# Patient Record
Sex: FEMALE | Race: WHITE | NOT HISPANIC OR LATINO | Employment: FULL TIME | URBAN - METROPOLITAN AREA
[De-identification: names, ages, dates, MRNs, and addresses within clinical notes are randomized per-mention and may not be internally consistent; named-entity substitution may affect disease eponyms.]

---

## 2017-02-24 ENCOUNTER — TRANSCRIBE ORDERS (OUTPATIENT)
Dept: LAB | Facility: CLINIC | Age: 42
End: 2017-02-24

## 2017-02-24 ENCOUNTER — APPOINTMENT (OUTPATIENT)
Dept: LAB | Facility: CLINIC | Age: 42
End: 2017-02-24
Payer: COMMERCIAL

## 2017-02-24 DIAGNOSIS — R00.2 PALPITATIONS: Primary | ICD-10-CM

## 2017-02-24 LAB — VENIPUNCTURE: NORMAL

## 2017-02-24 PROCEDURE — 36415 COLL VENOUS BLD VENIPUNCTURE: CPT

## 2019-01-14 ENCOUNTER — OFFICE VISIT (OUTPATIENT)
Dept: URGENT CARE | Facility: CLINIC | Age: 44
End: 2019-01-14
Payer: COMMERCIAL

## 2019-01-14 VITALS
HEIGHT: 61 IN | SYSTOLIC BLOOD PRESSURE: 128 MMHG | HEART RATE: 94 BPM | OXYGEN SATURATION: 99 % | TEMPERATURE: 100.9 F | DIASTOLIC BLOOD PRESSURE: 58 MMHG | WEIGHT: 130 LBS | BODY MASS INDEX: 24.55 KG/M2 | RESPIRATION RATE: 18 BRPM

## 2019-01-14 DIAGNOSIS — R68.89 FLU-LIKE SYMPTOMS: Primary | ICD-10-CM

## 2019-01-14 PROCEDURE — 99213 OFFICE O/P EST LOW 20 MIN: CPT | Performed by: NURSE PRACTITIONER

## 2019-01-14 NOTE — PROGRESS NOTES
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

## 2019-01-14 NOTE — PROGRESS NOTES
[facility]        NAME: Nathaniel Orourke is a 37 y o  female  : 1975    MRN: [id number]  DATE: 2019  TIME: 3:03 PM    Assessment and Plan   Flu-like symptoms [R68 89]  1  Flu-like symptoms           Patient Instructions     Influenza education provided  Follow up with PCP in 3-5 days  Proceed to  ER if symptoms worsen  She took aleve prior to coming to the clinic for fever  Chief Complaint     Chief Complaint   Patient presents with    Cold Like Symptoms     X 4 days    Fever         History of Present Illness       37year old female presents for symptoms of the flu that began 4 days ago  She did not get her flu shot this year, she is a  that was sent by her school nurse for a fevers of 102  She c/o fever, body aches, congestion, sinus pressure  The congestion and pressure ar worse while laying down  She has taken aleve and sudafed with some relief  She denies CP, SOB, wheezing  She is able to tolerate fluids, has a decrease in appetite  Review of Systems   Review of Systems   Constitutional: Positive for appetite change and fever  HENT: Positive for congestion and sinus pressure  Respiratory: Negative for shortness of breath and wheezing  Cardiovascular: Negative for chest pain  Gastrointestinal: Negative for nausea and vomiting  Musculoskeletal: Positive for myalgias  Neurological: Positive for headaches  Current Medications     No current outpatient prescriptions on file  Current Allergies     Allergies as of 2019    (No Known Allergies)            The following portions of the patient's history were reviewed and updated as appropriate: allergies, current medications, past family history, past medical history, past social history, past surgical history and problem list      History reviewed  No pertinent past medical history      Past Surgical History:   Procedure Laterality Date    GALLBLADDER SURGERY         History reviewed  No pertinent family history  Medications have been verified  Objective   /58 (BP Location: Left arm)   Pulse 94   Temp (!) 100 9 °F (38 3 °C) (Tympanic)   Resp 18   Ht 5' 1" (1 549 m)   Wt 59 kg (130 lb)   LMP 09/18/2018   SpO2 99%   BMI 24 56 kg/m²        Physical Exam     Physical Exam   Constitutional: She is oriented to person, place, and time  She appears well-developed and well-nourished  HENT:   Right Ear: Tympanic membrane, external ear and ear canal normal    Left Ear: Tympanic membrane, external ear and ear canal normal    Nose: Mucosal edema and rhinorrhea present  Mouth/Throat: Posterior oropharyngeal erythema present  No posterior oropharyngeal edema  Cardiovascular: Normal rate, regular rhythm and normal heart sounds  Pulmonary/Chest: Effort normal and breath sounds normal  No respiratory distress  She has no rales  Positive for postnasal drip   Neurological: She is alert and oriented to person, place, and time  Skin: Skin is warm and dry  Nursing note and vitals reviewed

## 2019-01-14 NOTE — PATIENT INSTRUCTIONS
Influenza   AMBULATORY CARE:   Influenza  (the flu) is an infection caused by the influenza virus  The flu is easily spread when an infected person coughs, sneezes, or has close contact with others  You may be able to spread the flu to others for 1 week or longer after signs or symptoms appear  Common signs and symptoms include the following:   · Fever and chills    · Headaches, body aches, and muscle or joint pain    · Cough, runny nose, and sore throat    · Loss of appetite, nausea, vomiting, or diarrhea    · Tiredness    · Trouble breathing  Call 911 for any of the following:   · You have trouble breathing, and your lips look purple or blue  · You have a seizure  Seek care immediately if:   · You are dizzy, or you are urinating less or not at all  · You have a headache with a stiff neck, and you feel tired or confused  · You have new pain or pressure in your chest     · Your symptoms, such as shortness of breath, vomiting, or diarrhea, get worse  · Your symptoms, such as fever and coughing, seem to get better, but then get worse  Contact your healthcare provider if:   · You have new muscle pain or weakness  · You have questions or concerns about your condition or care  Treatment for influenza  may include any of the following:  · Acetaminophen  decreases pain and fever  It is available without a doctor's order  Ask how much to take and how often to take it  Follow directions  Acetaminophen can cause liver damage if not taken correctly  · NSAIDs , such as ibuprofen, help decrease swelling, pain, and fever  This medicine is available with or without a doctor's order  NSAIDs can cause stomach bleeding or kidney problems in certain people  If you take blood thinner medicine, always ask your healthcare provider if NSAIDs are safe for you  Always read the medicine label and follow directions  · Antivirals  help fight a viral infection    Manage your symptoms:   · Rest  as much as you can to help you recover  · Drink liquids as directed  to help prevent dehydration  Ask how much liquid to drink each day and which liquids are best for you  Prevent the spread of the flu:   · Wash your hands often  Use soap and water  Wash your hands after you use the bathroom, change a child's diapers, or sneeze  Wash your hands before you prepare or eat food  Use gel hand cleanser when soap and water are not available  Do not touch your eyes, nose, or mouth unless you have washed your hands first            · Cover your mouth when you sneeze or cough  Cough into a tissue or the bend of your arm  · Clean shared items with a germ-killing   Clean table surfaces, doorknobs, and light switches  Do not share towels, silverware, and dishes with people who are sick  Wash bed sheets, towels, silverware, and dishes with soap and water  · Wear a mask  over your mouth and nose if you are sick or are near anyone who is sick  · Stay away from others  if you are sick  · Influenza vaccine  helps prevent influenza (flu)  Everyone older than 6 months should get a yearly influenza vaccine  Get the vaccine as soon as it is available, usually in September or October each year  Follow up with your healthcare provider as directed:  Write down your questions so you remember to ask them during your visits  © 2017 [employer] Information is for End User's use only and may not be sold, redistributed or otherwise used for commercial purposes  All illustrations and images included in CareNotes® are the copyrighted property of [employer]  or [employer]  The above information is an  only  It is not intended as medical advice for individual conditions or treatments  Talk to your doctor, nurse or pharmacist before following any medical regimen to see if it is safe and effective for you

## 2020-02-14 ENCOUNTER — TELEPHONE (OUTPATIENT)
Dept: ADMINISTRATIVE | Facility: OTHER | Age: 45
End: 2020-02-14

## 2020-02-14 ENCOUNTER — OFFICE VISIT (OUTPATIENT)
Dept: FAMILY MEDICINE CLINIC | Facility: CLINIC | Age: 45
End: 2020-02-14
Payer: COMMERCIAL

## 2020-02-14 VITALS
HEIGHT: 61 IN | HEART RATE: 71 BPM | RESPIRATION RATE: 12 BRPM | DIASTOLIC BLOOD PRESSURE: 60 MMHG | SYSTOLIC BLOOD PRESSURE: 98 MMHG | WEIGHT: 140 LBS | BODY MASS INDEX: 26.43 KG/M2 | OXYGEN SATURATION: 98 % | TEMPERATURE: 97.1 F

## 2020-02-14 DIAGNOSIS — R20.2 PARESTHESIA OF LEFT FOOT: Primary | ICD-10-CM

## 2020-02-14 DIAGNOSIS — Z76.89 ENCOUNTER TO ESTABLISH CARE: ICD-10-CM

## 2020-02-14 DIAGNOSIS — Z12.39 SCREENING FOR MALIGNANT NEOPLASM OF BREAST: ICD-10-CM

## 2020-02-14 DIAGNOSIS — I95.9 HYPOTENSION, UNSPECIFIED HYPOTENSION TYPE: ICD-10-CM

## 2020-02-14 DIAGNOSIS — Z11.4 SCREENING FOR HIV (HUMAN IMMUNODEFICIENCY VIRUS): ICD-10-CM

## 2020-02-14 DIAGNOSIS — M25.562 LEFT KNEE PAIN, UNSPECIFIED CHRONICITY: ICD-10-CM

## 2020-02-14 DIAGNOSIS — M25.472 LEFT ANKLE SWELLING: ICD-10-CM

## 2020-02-14 PROCEDURE — 1036F TOBACCO NON-USER: CPT | Performed by: NURSE PRACTITIONER

## 2020-02-14 PROCEDURE — 3008F BODY MASS INDEX DOCD: CPT | Performed by: NURSE PRACTITIONER

## 2020-02-14 PROCEDURE — 36415 COLL VENOUS BLD VENIPUNCTURE: CPT | Performed by: NURSE PRACTITIONER

## 2020-02-14 PROCEDURE — 99214 OFFICE O/P EST MOD 30 MIN: CPT | Performed by: NURSE PRACTITIONER

## 2020-02-14 NOTE — PROGRESS NOTES
Assessment/Plan:  1  Paresthesia of left foot  - CBC and differential  - Comprehensive metabolic panel  2  Left knee pain, unspecified chronicity  - CBC and differential  - Comprehensive metabolic panel  3  Left ankle swelling  - CBC and differential  - Comprehensive metabolic panel  4  Encounter to establish care  Health maintenance discussed  Diet, exercise, weight management, stress management, etc    All questions addressed, answered, and pt verbalized understanding  Anticipatory guidance  - CBC and differential  - Comprehensive metabolic panel  - Hemoglobin A1C  - Lipid panel  - TSH, 3rd generation  - Vitamin D 25 hydroxy  5  Hypotension, unspecified hypotension type  - CBC and differential  - Comprehensive metabolic panel  - TSH, 3rd generation  6  Screening for malignant neoplasm of breast  - Mammo screening bilateral w cad; Future  7  Screening for HIV (human immunodeficiency virus)  - Human Immunodeficiency Virus 1/2 Antigen / Antibody ( Fourth Generation) with Reflex Testing         BMI Counseling: Body mass index is 26 45 kg/m²  The BMI is above normal  Nutrition recommendations include reducing portion sizes, decreasing overall calorie intake and moderation in carbohydrate intake  Exercise recommendations include exercising 3-5 times per week  continue regular exercise program    Depression Screening Follow-up Plan: Patient's depression screening was positive with a PHQ-2 score of 0  Their PHQ-9 score was 0  Clinically patient does not have depression  No treatment is required  Subjective:      Patient ID: Patrice Mata is a 40 y o  female  here to establish care  Pt here to establish care  PMH, meds, allergies, SH, FH reviewed    Lives with   Has 25and 21year old children  No tobacco  Social etoh  No recreational drug use  Works as      Current medical conditions include: none  Current issues or concerns include: left foot numb for a several months  No back injury  Pain left knee for the same amount of time  No known injury  Left ankle swelling intermittently  No sob  No chest pain  Exercises regularly  Reports that blood pressure has always been low  Denies dizziness or lightheadedness  The following portions of the patient's history were reviewed and updated as appropriate: allergies, current medications, past family history, past medical history, past social history, past surgical history and problem list     Review of Systems   Constitutional: Negative for activity change, appetite change, fatigue and unexpected weight change  HENT: Negative for congestion and sore throat  Eyes: Negative for visual disturbance  Respiratory: Negative for chest tightness, shortness of breath and wheezing  Cardiovascular: Positive for leg swelling (left ankle at times)  Negative for chest pain and palpitations  Gastrointestinal: Negative for abdominal pain, diarrhea, nausea and vomiting  Endocrine: Negative for polydipsia, polyphagia and polyuria  Genitourinary: Negative for dysuria, frequency and urgency  Musculoskeletal: Positive for arthralgias (left knee at times) and joint swelling (left ankle at times)  Negative for back pain, myalgias and neck pain  Skin: Negative for color change, pallor, rash and wound  Allergic/Immunologic: Negative for immunocompromised state  Neurological: Negative for dizziness and headaches  Hematological: Does not bruise/bleed easily  Psychiatric/Behavioral: Negative for self-injury and suicidal ideas  The patient is not nervous/anxious  All other systems reviewed and are negative          Objective:      BP 98/60 (BP Location: Right arm, Patient Position: Sitting, Cuff Size: Standard)   Pulse 71   Temp (!) 97 1 °F (36 2 °C) (Temporal)   Resp 12   Ht 5' 1" (1 549 m)   Wt 63 5 kg (140 lb)   LMP 01/13/2020 (Approximate)   SpO2 98%   BMI 26 45 kg/m²          Physical Exam Constitutional: She is oriented to person, place, and time  She appears well-developed and well-nourished  No distress  Neck: Normal range of motion  Neck supple  No thyromegaly present  Cardiovascular: Normal rate and regular rhythm  No JVD  No audible carotid bruit  No peripheral edema  Pulmonary/Chest: Effort normal and breath sounds normal  No respiratory distress  She has no wheezes  Neurological: She is alert and oriented to person, place, and time  No cranial nerve deficit  Coordination normal    Skin: Skin is warm and dry  No rash noted  She is not diaphoretic  No erythema  No pallor  Psychiatric: She has a normal mood and affect  Her behavior is normal  Judgment and thought content normal    Vitals reviewed

## 2020-02-14 NOTE — TELEPHONE ENCOUNTER
----- Message from Clary June [employer] sent at 2/14/2020 10:37 AM EST -----  Regarding: PAP- [facility]  Contact: [phone]  02/14/20 10:40 AM    Hello, our patient Loleta Krabbe has had Pap Smear (HPV) aka Cervical Cancer Screening completed/performed  Please assist in updating the patient chart by making an External outreach to [facility] facility located in [place], [place]  The date of service is 3/2019      Thank you,  MARCO June PG

## 2020-02-14 NOTE — LETTER
Procedure Request Form: Cervical Cancer Screening      Date Requested: 20  Patient: Hector Juan  Patient : 1975   Referring Provider: Angelica Blanco, CRNP        Date of Procedure ______________________________       The above patient has informed us that they have completed their   most recent Cervical Cancer Screening at your facility  Please complete   this form and attach all corresponding procedure reports/results  Comments __________________________________________________________  ____________________________________________________________________  ____________________________________________________________________  ____________________________________________________________________    Facility Completing Procedure _________________________________________    Form Completed By (print name) _______________________________________      Signature __________________________________________________________      These reports are needed for  compliance    Please fax this completed form and a copy of the procedure report to our office located at [place] [address] as soon as possible to [phone] attention Vickie Nolasco: Phone [phone]    We thank you for your assistance in treating our mutual patient

## 2020-02-14 NOTE — LETTER
Procedure Request Form: Cervical Cancer Screening      Date Requested: 20  Patient: Sindy Shah  Patient : 1975   Referring Provider: Brent Hernandez, ZENOBIA        Date of Procedure ______________________________       The above patient has informed us that they have completed their   most recent Cervical Cancer Screening at your facility  Please complete   this form and attach all corresponding procedure reports/results  Comments __________________________________________________________  ____________________________________________________________________  ____________________________________________________________________  ____________________________________________________________________    Facility Completing Procedure _________________________________________    Form Completed By (print name) _______________________________________      Signature __________________________________________________________      These reports are needed for  compliance    Please fax this completed form and a copy of the procedure report to our office located at [place] [address] as soon as possible to [phone] attention Brayan Lerma: Phone [phone]    We thank you for your assistance in treating our mutual patient

## 2020-02-14 NOTE — TELEPHONE ENCOUNTER
Upon review of the In Basket request and the patient's chart, initial outreach has been made via fax, please see Contacts section for details  A second outreach attempt will be made within 3 business days      Thank you  Marcie Vuong

## 2020-02-16 LAB
25(OH)D3+25(OH)D2 SERPL-MCNC: 25.2 NG/ML (ref 30–100)
ALBUMIN SERPL-MCNC: 4.3 G/DL (ref 3.8–4.8)
ALBUMIN/GLOB SERPL: 2.2 {RATIO} (ref 1.2–2.2)
ALP SERPL-CCNC: 65 IU/L (ref 39–117)
ALT SERPL-CCNC: 28 IU/L (ref 0–32)
AST SERPL-CCNC: 34 IU/L (ref 0–40)
BASOPHILS # BLD AUTO: 0 X10E3/UL (ref 0–0.2)
BASOPHILS NFR BLD AUTO: 1 %
BILIRUB SERPL-MCNC: 0.2 MG/DL (ref 0–1.2)
BUN SERPL-MCNC: 19 MG/DL (ref 6–24)
BUN/CREAT SERPL: 24 (ref 9–23)
CALCIUM SERPL-MCNC: 9.3 MG/DL (ref 8.7–10.2)
CHLORIDE SERPL-SCNC: 104 MMOL/L (ref 96–106)
CHOLEST SERPL-MCNC: 183 MG/DL (ref 100–199)
CHOLEST/HDLC SERPL: 2.9 RATIO (ref 0–4.4)
CO2 SERPL-SCNC: 26 MMOL/L (ref 20–29)
CREAT SERPL-MCNC: 0.78 MG/DL (ref 0.57–1)
EOSINOPHIL # BLD AUTO: 0.4 X10E3/UL (ref 0–0.4)
EOSINOPHIL NFR BLD AUTO: 10 %
ERYTHROCYTE [DISTWIDTH] IN BLOOD BY AUTOMATED COUNT: 13.8 % (ref 11.7–15.4)
EST. AVERAGE GLUCOSE BLD GHB EST-MCNC: 103 MG/DL
GLOBULIN SER-MCNC: 2 G/DL (ref 1.5–4.5)
GLUCOSE SERPL-MCNC: 96 MG/DL (ref 65–99)
HBA1C MFR BLD: 5.2 % (ref 4.8–5.6)
HCT VFR BLD AUTO: 52.4 % (ref 34–46.6)
HDLC SERPL-MCNC: 64 MG/DL
HGB BLD-MCNC: 17.5 G/DL (ref 11.1–15.9)
HIV 1+2 AB+HIV1 P24 AG SERPL QL IA: NON REACTIVE
IMM GRANULOCYTES # BLD: 0 X10E3/UL (ref 0–0.1)
IMM GRANULOCYTES NFR BLD: 0 %
LDLC SERPL CALC-MCNC: 98 MG/DL (ref 0–99)
LYMPHOCYTES # BLD AUTO: 2.1 X10E3/UL (ref 0.7–3.1)
LYMPHOCYTES NFR BLD AUTO: 50 %
MCH RBC QN AUTO: 30.1 PG (ref 26.6–33)
MCHC RBC AUTO-ENTMCNC: 33.4 G/DL (ref 31.5–35.7)
MCV RBC AUTO: 90 FL (ref 79–97)
MONOCYTES # BLD AUTO: 0.3 X10E3/UL (ref 0.1–0.9)
MONOCYTES NFR BLD AUTO: 7 %
NEUTROPHILS # BLD AUTO: 1.4 X10E3/UL (ref 1.4–7)
NEUTROPHILS NFR BLD AUTO: 32 %
PLATELET # BLD AUTO: 123 X10E3/UL (ref 150–450)
POTASSIUM SERPL-SCNC: 4.6 MMOL/L (ref 3.5–5.2)
PROT SERPL-MCNC: 6.3 G/DL (ref 6–8.5)
RBC # BLD AUTO: 5.81 X10E6/UL (ref 3.77–5.28)
SL AMB EGFR AFRICAN AMERICAN: 107 ML/MIN/1.73
SL AMB EGFR NON AFRICAN AMERICAN: 93 ML/MIN/1.73
SL AMB VLDL CHOLESTEROL CALC: 21 MG/DL (ref 5–40)
SODIUM SERPL-SCNC: 144 MMOL/L (ref 134–144)
TRIGL SERPL-MCNC: 107 MG/DL (ref 0–149)
TSH SERPL DL<=0.005 MIU/L-ACNC: 1.44 UIU/ML (ref 0.45–4.5)
WBC # BLD AUTO: 4.3 X10E3/UL (ref 3.4–10.8)

## 2020-02-18 ENCOUNTER — OFFICE VISIT (OUTPATIENT)
Dept: FAMILY MEDICINE CLINIC | Facility: CLINIC | Age: 45
End: 2020-02-18
Payer: COMMERCIAL

## 2020-02-18 VITALS
SYSTOLIC BLOOD PRESSURE: 100 MMHG | HEIGHT: 61 IN | TEMPERATURE: 97.7 F | DIASTOLIC BLOOD PRESSURE: 70 MMHG | OXYGEN SATURATION: 97 % | RESPIRATION RATE: 12 BRPM | BODY MASS INDEX: 26.62 KG/M2 | HEART RATE: 64 BPM | WEIGHT: 141 LBS

## 2020-02-18 DIAGNOSIS — Z23 NEED FOR DIPHTHERIA-TETANUS-PERTUSSIS (TDAP) VACCINE: ICD-10-CM

## 2020-02-18 DIAGNOSIS — Z00.00 ANNUAL PHYSICAL EXAM: Primary | ICD-10-CM

## 2020-02-18 DIAGNOSIS — R20.2 PARESTHESIA OF HAND, BILATERAL: ICD-10-CM

## 2020-02-18 DIAGNOSIS — R20.2 PARESTHESIA OF LEFT FOOT: ICD-10-CM

## 2020-02-18 DIAGNOSIS — G89.29 CHRONIC PAIN OF LEFT KNEE: ICD-10-CM

## 2020-02-18 DIAGNOSIS — M25.562 CHRONIC PAIN OF LEFT KNEE: ICD-10-CM

## 2020-02-18 DIAGNOSIS — E55.9 VITAMIN D DEFICIENCY: ICD-10-CM

## 2020-02-18 PROCEDURE — 3008F BODY MASS INDEX DOCD: CPT | Performed by: NURSE PRACTITIONER

## 2020-02-18 PROCEDURE — 90715 TDAP VACCINE 7 YRS/> IM: CPT

## 2020-02-18 PROCEDURE — 99396 PREV VISIT EST AGE 40-64: CPT | Performed by: NURSE PRACTITIONER

## 2020-02-18 PROCEDURE — 90471 IMMUNIZATION ADMIN: CPT

## 2020-02-18 NOTE — PROGRESS NOTES
FAMILY PRACTICE HEALTH MAINTENANCE OFFICE VISIT  [facility]    NAME: Ольга Garcia  AGE: 40 y o  SEX: female  : 1975     DATE: 2020    Assessment and Plan   1  Annual physical exam  Health maintenance discussed  Diet, exercise, weight management, stress management, etc    All questions addressed, answered, and pt verbalized understanding  Anticipatory guidance  2  Vitamin D deficiency  otc vitamin D3 1000 units daily  3  Paresthesia of left foot  - Ambulatory referral to Neurology; Future  4  Chronic pain of left knee  ? Etiology  As discussed may consider PT and/or ortho eval  5  Paresthesia of hand, bilateral  - Ambulatory referral to Neurology; Future  6  Need for diphtheria-tetanus-pertussis (Tdap) vaccine  - TDAP VACCINE GREATER THAN OR EQUAL TO 6YO IM      · Patient Counseling:   · Nutrition: Stressed importance of a well balanced diet, moderation of sodium/saturated fat, caloric balance and sufficient intake of fiber  · Exercise: Stressed the importance of regular exercise with a goal of 150 minutes per week  · Dental Health: Discussed daily flossing and brushing and regular dental visits   · Alcohol Use:  Recommended moderation of alcohol intake  · Injury Prevention: Discussed Safety Belts, Safety Helmets, and Smoke Detectors    · Immunizations reviewed: See Orders and Risks and Benefits discussed  · Discussed benefits of:  Mammogram , Cervical Cancer screening and Screening labs   BMI Counseling: Body mass index is 26 64 kg/m²  Discussed with patient's BMI with her  The BMI is above normal  Nutrition recommendations include reducing portion sizes, decreasing overall calorie intake, reducing fast food intake and moderation in carbohydrate intake  Exercise recommendations include exercising 3-5 times per week            Chief Complaint     Chief Complaint   Patient presents with    Physical Exam     Pt here for a annual exam  History of Present Illness     Here for annual exam and to review labs  Having issues with numbness left foot and both hands  Intermittent  At times, left knee pain  No known back injury  Active  Exercises regularly  Well Adult Physical   Patient here for a comprehensive physical exam       Diet and Physical Activity  Diet: well balanced diet  Exercise: frequently      Depression Screen  PHQ-9 Depression Screening    PHQ-9:    Frequency of the following problems over the past two weeks:       Little interest or pleasure in doing things:  0 - not at all  Feeling down, depressed, or hopeless:  0 - not at all  PHQ-2 Score:  0     Depression Screening Follow-up Plan: Patient's depression screening was negative with a PHQ-2 score of 0  Their PHQ-9 score was 0   Clinically patient does not have depression  No treatment is required  General Health  Hearing: Normal:  bilateral  Vision: goes for regular eye exams, most recent eye exam <1 year and wears contacts  Dental: regular dental visits and brushes teeth twice daily    Reproductive Health  Irregular Periods and Follows with gynecologist      The following portions of the patient's history were reviewed and updated as appropriate: allergies, current medications, past family history, past medical history, past social history, past surgical history and problem list     Review of Systems     Review of Systems   Constitutional: Negative for chills, diaphoresis, fatigue and fever  HENT: Negative for congestion, ear pain, sinus pressure, sinus pain and sore throat  Eyes: Negative for visual disturbance  Respiratory: Negative for cough, chest tightness, shortness of breath and wheezing  Cardiovascular: Negative for chest pain, palpitations and leg swelling  Gastrointestinal: Negative for abdominal distention, abdominal pain, diarrhea and nausea  Endocrine: Negative for cold intolerance, heat intolerance, polydipsia, polyphagia and polyuria  Genitourinary: Negative for dysuria, frequency and urgency  Musculoskeletal: Positive for arthralgias (left knee)  Negative for back pain and myalgias  Skin: Negative for color change and pallor  Allergic/Immunologic: Negative for immunocompromised state  Neurological: Positive for numbness (left foot, both hands  )  Negative for dizziness, weakness and headaches  Hematological: Negative for adenopathy  Psychiatric/Behavioral: Negative for agitation, self-injury and suicidal ideas  The patient is not nervous/anxious  All other systems reviewed and are negative  Past Medical History     No past medical history on file      Past Surgical History     Past Surgical History:   Procedure Laterality Date    GALLBLADDER SURGERY         Social History     Social History     Socioeconomic History    Marital status: /Civil Union     Spouse name: None    Number of children: None    Years of education: None    Highest education level: None   Occupational History    None   Social Needs    Financial resource strain: None    Food insecurity:     Worry: None     Inability: None    Transportation needs:     Medical: None     Non-medical: None   Tobacco Use    Smoking status: Never Smoker    Smokeless tobacco: Never Used   Substance and Sexual Activity    Alcohol use: Yes     Frequency: Monthly or less    Drug use: No    Sexual activity: Yes     Partners: Male     Birth control/protection: Male Sterilization   Lifestyle    Physical activity:     Days per week: None     Minutes per session: None    Stress: None   Relationships    Social connections:     Talks on phone: None     Gets together: None     Attends Sikhism service: None     Active member of club or organization: None     Attends meetings of clubs or organizations: None     Relationship status: None    Intimate partner violence:     Fear of current or ex partner: None     Emotionally abused: None     Physically abused: None Forced sexual activity: None   Other Topics Concern    None   Social History Narrative    None       Family History     Family History   Problem Relation Age of Onset    No Known Problems Mother     No Known Problems Father     Substance Abuse Neg Hx     Mental illness Neg Hx        Current Medications     No current outpatient medications on file       Allergies     No Known Allergies    Objective     Recent Results (from the past 672 hour(s))   CBC and differential    Collection Time: 02/14/20 11:03 AM   Result Value Ref Range    White Blood Cell Count 4 3 3 4 - 10 8 x10E3/uL    Red Blood Cell Count 5 81 (H) 3 77 - 5 28 x10E6/uL    Hemoglobin 17 5 (H) 11 1 - 15 9 g/dL    HCT 52 4 (H) 34 0 - 46 6 %    MCV 90 79 - 97 fL    MCH 30 1 26 6 - 33 0 pg    MCHC 33 4 31 5 - 35 7 g/dL    RDW 13 8 11 7 - 15 4 %    Platelet Count 663 (L) 150 - 450 x10E3/uL    Neutrophils 32 Not Estab  %    Lymphocytes 50 Not Estab  %    Monocytes 7 Not Estab  %    Eosinophils 10 Not Estab  %    Basophils PCT 1 Not Estab  %    Neutrophils (Absolute) 1 4 1 4 - 7 0 x10E3/uL    Lymphocytes (Absolute) 2 1 0 7 - 3 1 x10E3/uL    Monocytes (Absolute) 0 3 0 1 - 0 9 x10E3/uL    Eosinophils (Absolute) 0 4 0 0 - 0 4 x10E3/uL    Basophils ABS 0 0 0 0 - 0 2 x10E3/uL    Immature Granulocytes 0 Not Estab  %    Immature Granulocytes (Absolute) 0 0 0 0 - 0 1 x10E3/uL   Comprehensive metabolic panel    Collection Time: 02/14/20 11:03 AM   Result Value Ref Range    Glucose, Random 96 65 - 99 mg/dL    BUN 19 6 - 24 mg/dL    Creatinine 0 78 0 57 - 1 00 mg/dL    eGFR Non  93 >59 mL/min/1 73    eGFR  107 >59 mL/min/1 73    SL AMB BUN/CREATININE RATIO 24 (H) 9 - 23    Sodium 144 134 - 144 mmol/L    Potassium 4 6 3 5 - 5 2 mmol/L    Chloride 104 96 - 106 mmol/L    CO2 26 20 - 29 mmol/L    CALCIUM 9 3 8 7 - 10 2 mg/dL    Protein, Total 6 3 6 0 - 8 5 g/dL    Albumin 4 3 3 8 - 4 8 g/dL    Globulin, Total 2 0 1 5 - 4 5 g/dL Albumin/Globulin Ratio 2 2 1 2 - 2 2    TOTAL BILIRUBIN 0 2 0 0 - 1 2 mg/dL    Alk Phos Isoenzymes 65 39 - 117 IU/L    AST 34 0 - 40 IU/L    ALT 28 0 - 32 IU/L   Hemoglobin A1C    Collection Time: 02/14/20 11:03 AM   Result Value Ref Range    Hemoglobin A1C 5 2 4 8 - 5 6 %    Estimated Average Glucose 103 mg/dL   Lipid panel    Collection Time: 02/14/20 11:03 AM   Result Value Ref Range    Cholesterol, Total 183 100 - 199 mg/dL    Triglycerides 107 0 - 149 mg/dL    HDL 64 >39 mg/dL    VLDL Cholesterol Calculated 21 5 - 40 mg/dL    LDL Direct 98 0 - 99 mg/dL    T  Chol/HDL Ratio 2 9 0 0 - 4 4 ratio   TSH, 3rd generation    Collection Time: 02/14/20 11:03 AM   Result Value Ref Range    TSH 1 440 0 450 - 4 500 uIU/mL   Vitamin D 25 hydroxy    Collection Time: 02/14/20 11:03 AM   Result Value Ref Range    25-HYDROXY VIT D 25 2 (L) 30 0 - 100 0 ng/mL   Human Immunodeficiency Virus 1/2 Antigen / Antibody ( Fourth Generation) with Reflex Testing    Collection Time: 02/14/20 11:03 AM   Result Value Ref Range    HIV Screen 4th Generation wRflx Non Reactive Non Reactive           /70 (BP Location: Right arm, Patient Position: Sitting, Cuff Size: Adult)   Pulse 64   Temp 97 7 °F (36 5 °C) (Temporal)   Resp 12   Ht 5' 1" (1 549 m)   Wt 64 kg (141 lb)   LMP 01/06/2020 (Approximate)   SpO2 97%   BMI 26 64 kg/m²      Physical Exam   Constitutional: She is oriented to person, place, and time  She appears well-developed and well-nourished  No distress  HENT:   Head: Normocephalic  Mouth/Throat: Oropharynx is clear and moist  No oropharyngeal exudate  Eyes: Pupils are equal, round, and reactive to light  EOM are normal    Neck: Normal range of motion  Neck supple  No thyromegaly present  Cardiovascular: Normal rate and regular rhythm  No JVD  No audible carotid bruit  No peripheral edema  Pulmonary/Chest: Effort normal and breath sounds normal  No respiratory distress  She has no wheezes     Abdominal: Soft  Bowel sounds are normal  She exhibits no distension  There is no tenderness  Musculoskeletal: Normal range of motion  She exhibits no edema, tenderness or deformity  Lymphadenopathy:     She has no cervical adenopathy  Neurological: She is alert and oriented to person, place, and time  No cranial nerve deficit or sensory deficit  Coordination normal    Skin: Skin is warm and dry  No rash noted  She is not diaphoretic  No erythema  No pallor  Psychiatric: She has a normal mood and affect  Her behavior is normal  Judgment and thought content normal    Vitals reviewed  Visual Acuity Screening    Right eye Left eye Both eyes   Without correction:      With correction: 20/15 20/13 20/13   Comments: Pt correctly identified the colors  sp/cma          Kavin Lainez

## 2020-02-18 NOTE — PATIENT INSTRUCTIONS
Well balanced diet  Regular exercise  Start over the counter vitamin D3 1000 units daily     Daily multivitamin  Schedule appt for mammogram  Schedule appt with neurologist

## 2020-02-24 NOTE — TELEPHONE ENCOUNTER
As a follow-up, a second attempt has been made for outreach via fax, please see Contacts section for details  A third and final attempt will be made within 3 business days      Thank you  Ulysses Champion

## 2020-02-28 NOTE — TELEPHONE ENCOUNTER
As a final attempt, a third outreach has been made via telephone call  Please see Contacts section for details  This encounter will be closed and completed by end of day  Should we receive the requested information because of previous outreach attempts, the requested patient's chart will be updated appropriately       Thank you  Iain Villanueva

## 2020-12-22 ENCOUNTER — TELEMEDICINE (OUTPATIENT)
Dept: FAMILY MEDICINE CLINIC | Facility: CLINIC | Age: 45
End: 2020-12-22
Payer: COMMERCIAL

## 2020-12-22 VITALS — TEMPERATURE: 97.5 F

## 2020-12-22 DIAGNOSIS — Z20.822 EXPOSURE TO COVID-19 VIRUS: ICD-10-CM

## 2020-12-22 DIAGNOSIS — Z20.822 EXPOSURE TO COVID-19 VIRUS: Primary | ICD-10-CM

## 2020-12-22 PROCEDURE — U0003 INFECTIOUS AGENT DETECTION BY NUCLEIC ACID (DNA OR RNA); SEVERE ACUTE RESPIRATORY SYNDROME CORONAVIRUS 2 (SARS-COV-2) (CORONAVIRUS DISEASE [COVID-19]), AMPLIFIED PROBE TECHNIQUE, MAKING USE OF HIGH THROUGHPUT TECHNOLOGIES AS DESCRIBED BY CMS-2020-01-R: HCPCS | Performed by: NURSE PRACTITIONER

## 2020-12-22 PROCEDURE — 99213 OFFICE O/P EST LOW 20 MIN: CPT | Performed by: NURSE PRACTITIONER

## 2020-12-24 LAB — SARS-COV-2 RNA SPEC QL NAA+PROBE: NOT DETECTED

## 2020-12-28 ENCOUNTER — TELEMEDICINE (OUTPATIENT)
Dept: FAMILY MEDICINE CLINIC | Facility: CLINIC | Age: 45
End: 2020-12-28
Payer: COMMERCIAL

## 2020-12-28 VITALS — TEMPERATURE: 98 F

## 2020-12-28 DIAGNOSIS — Z20.822 EXPOSURE TO COVID-19 VIRUS: Primary | ICD-10-CM

## 2020-12-28 PROCEDURE — 1036F TOBACCO NON-USER: CPT | Performed by: NURSE PRACTITIONER

## 2020-12-28 PROCEDURE — 99212 OFFICE O/P EST SF 10 MIN: CPT | Performed by: NURSE PRACTITIONER

## 2021-02-08 ENCOUNTER — TELEMEDICINE (OUTPATIENT)
Dept: FAMILY MEDICINE CLINIC | Facility: CLINIC | Age: 46
End: 2021-02-08
Payer: COMMERCIAL

## 2021-02-08 VITALS — TEMPERATURE: 97.2 F

## 2021-02-08 DIAGNOSIS — Z20.822 EXPOSURE TO COVID-19 VIRUS: ICD-10-CM

## 2021-02-08 DIAGNOSIS — Z20.822 EXPOSURE TO COVID-19 VIRUS: Primary | ICD-10-CM

## 2021-02-08 PROCEDURE — U0003 INFECTIOUS AGENT DETECTION BY NUCLEIC ACID (DNA OR RNA); SEVERE ACUTE RESPIRATORY SYNDROME CORONAVIRUS 2 (SARS-COV-2) (CORONAVIRUS DISEASE [COVID-19]), AMPLIFIED PROBE TECHNIQUE, MAKING USE OF HIGH THROUGHPUT TECHNOLOGIES AS DESCRIBED BY CMS-2020-01-R: HCPCS | Performed by: NURSE PRACTITIONER

## 2021-02-08 PROCEDURE — 99213 OFFICE O/P EST LOW 20 MIN: CPT | Performed by: NURSE PRACTITIONER

## 2021-02-08 PROCEDURE — U0005 INFEC AGEN DETEC AMPLI PROBE: HCPCS | Performed by: NURSE PRACTITIONER

## 2021-02-08 NOTE — PATIENT INSTRUCTIONS
Refer to [facility] [place] for specific information and answers to many frequently asked questions  You are being tested for Covid19  If Covid-19 test is positive, self isolation for 10 days from onset of symptoms and 24 hours with no symptoms and no fever without use of anti-pyrectic medications (ie: Tylenol, Ibuprofen, Advil, etc ) before discontinuing self isolation and/or return to work or normal activities  If you test positive, even with no symptoms, you need to isolate for a full 10 days after positive test    While in self isolation you should be using a private bathroom, sleeping area, mask in the home if any other people are present, no shared eating utensils, etc    Deep breathing exercises to expand lung fields  Monitor temperature daily or if you feel you have a fever  Tylenol or Advil/Ibuprofen is appropriate for fever control  Check pulse oximetry,  if able,  daily and if you are having any increased shortness of breath or chest tightness  Call the office if you are having any increased respiratory symptoms or if pulse oximetry is persistently less than 92%  Current recommendations for treatment include daily multivitamin, vitamin C, zinc, and vitamin D supplementation  10 day quarantine is recommended for any high risk exposures , starting on date of initial exposure  High risk exposure is defined as being within 6 feet for 15 minutes cumulatively over 24 hours with a Covid positive patient unmasked  Social distancing is imperative  The use of a mask in public places is recommended going forward       CDC emergency warning signs for when to seek medical attention immediately:  - difficulty breathing or shortness of breath  - persistent pain or pressure in the chest   - new confusion or inability to arouse  - bluish lips or face   If you should need to seek medical care, you should notify the ED or EMS that you are under investigation and/or positive for COVID-19 prior and wear a facemask if possible, scarf or bandana

## 2021-02-08 NOTE — PROGRESS NOTES
COVID-19 Virtual Visit     Assessment/Plan:  1  Exposure to COVID-19 virus  Refer to CDC  gov for specific information and answers to many frequently asked questions  You are being tested for Covid19  If Covid-19 test is positive, self isolation for 10 days from onset of symptoms and 24 hours with no symptoms and no fever without use of anti-pyrectic medications (ie: Tylenol, Ibuprofen, Advil, etc ) before discontinuing self isolation and/or return to work or normal activities  If you test positive, even with no symptoms, you need to isolate for a full 10 days after positive test    While in self isolation you should be using a private bathroom, sleeping area, mask in the home if any other people are present, no shared eating utensils, etc    Deep breathing exercises to expand lung fields  Monitor temperature daily or if you feel you have a fever  Tylenol or Advil/Ibuprofen is appropriate for fever control  Check pulse oximetry,  if able,  daily and if you are having any increased shortness of breath or chest tightness  Call the office if you are having any increased respiratory symptoms or if pulse oximetry is persistently less than 92%  Current recommendations for treatment include daily multivitamin, vitamin C, zinc, and vitamin D supplementation  10 day quarantine is recommended for any high risk exposures , starting on date of initial exposure  High risk exposure is defined as being within 6 feet for 15 minutes cumulatively over 24 hours with a Covid positive patient unmasked  Social distancing is imperative  The use of a mask in public places is recommended going forward       [facility] [place] emergency warning signs for when to seek medical attention immediately:  - difficulty breathing or shortness of breath  - persistent pain or pressure in the chest   - new confusion or inability to arouse  - bluish lips or face   If you should need to seek medical care, you should notify the ED or EMS that you are under investigation and/or positive for COVID-19 prior and wear a facemask if possible, scarf or bandana     - Novel Coronavirus (Covid-19),PCR [facility] - Collected at [employer] or [facility]; Future    Problem List Items Addressed This Visit     None      Visit Diagnoses     Exposure to COVID-19 virus    -  Primary    Relevant Orders    Novel Coronavirus (Covid-19),PCR [facility] - Collected at [employer] or [facility]         Disposition:     I referred patient to one of our centralized sites for a COVID-19 swab  Pt with (+) exposure  Requests covid test    I have spent 15 minutes directly with the patient  Greater than 50% of this time was spent in counseling/coordination of care regarding: instructions for management, patient and family education, importance of treatment compliance and impressions  Encounter provider ZENOBIA Desai    Provider located at [place] [address]  [address] [address]    Recent Visits  No visits were found meeting these conditions  Showing recent visits within past 7 days and meeting all other requirements     Today's Visits  Date Type Provider Dept   02/08/21 Telemedicine ZENOBIA Desai  Zac Guzman   Showing today's visits and meeting all other requirements     Future Appointments  No visits were found meeting these conditions  Showing future appointments within next 150 days and meeting all other requirements      This virtual check-in was done via [employer] and patient was informed that this is a secure, HIPAA-compliant platform  She agrees to proceed  Patient agrees to participate in a virtual check in via telephone or video visit instead of presenting to the office to address urgent/immediate medical needs  Patient is aware this is a billable service  After connecting through [facility] [place], the patient was identified by name and date of birth   Chitraboby Christi was informed that this was a telemedicine visit and that the exam was being conducted confidentially over secure lines  My office door was closed  No one else was in the room  Theo Hartley acknowledged consent and understanding of privacy and security of the telemedicine visit  I informed the patient that I have reviewed her record in Epic and presented the opportunity for her to ask any questions regarding the visit today  The patient agreed to participate  Subjective:   Theo Hartley is a 39 y o  female who is concerned about COVID-19  Patient is currently asymptomatic  Patient denies fever, chills, fatigue, malaise, congestion, rhinorrhea, sore throat, anosmia, loss of taste, cough, shortness of breath, chest tightness, abdominal pain, nausea, vomiting, diarrhea, myalgias and headaches  Date of exposure: 2/5/2021    Exposure:   Contact with a person who is under investigation (PUI) for or who is positive for COVID-19 within the last 14 days?: Yes    Hospitalized recently for fever and/or lower respiratory symptoms?: No      Currently a healthcare worker that is involved in direct patient care?: No      Works in a special setting where the risk of COVID-19 transmission may be high? (this may include long-term care, correctional and longterm facilities; homeless shelters; assisted-living facilities and group homes ): No      Resident in a special setting where the risk of COVID-19 transmission may be high? 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 and do not always wear masks properly and do not social distance  Pt is asymptomatic  Would like to be tested     Will be on quarantine as per school guidelines until 2/22/2021    Lab Results   Component Value Date Phong Dill Not Detected 12/22/2020     History reviewed  No pertinent past medical history  Past Surgical History:   Procedure Laterality Date    GALLBLADDER SURGERY       No current outpatient medications on file  No current facility-administered medications for this visit  No Known Allergies    Review of Systems   Constitutional: Negative for chills, fatigue and fever  HENT: Negative for congestion, rhinorrhea and sore throat  Respiratory: Negative for cough, chest tightness and shortness of breath  Gastrointestinal: Negative for abdominal pain, diarrhea, nausea and vomiting  Musculoskeletal: Negative for myalgias  Neurological: Negative for headaches  Objective:    Vitals:    02/08/21 1216   Temp: (!) 97 2 °F (36 2 °C)   TempSrc: Oral       Physical Exam  Constitutional:       Appearance: She is not ill-appearing  HENT:      Head: Normocephalic  Eyes:      General: No scleral icterus  Pulmonary:      Effort: Pulmonary effort is normal  No respiratory distress  Skin:     Coloration: Skin is not jaundiced  Neurological:      Mental Status: She is alert and oriented to person, place, and time  Psychiatric:         Mood and Affect: Mood normal          Behavior: Behavior normal          Thought Content: Thought content normal          Judgment: Judgment normal        VIRTUAL VISIT DISCLAIMER    Nica Caponeahsan Santos acknowledges that she has consented to an online visit or consultation  She understands that the online visit is based solely on information provided by her, and that, in the absence of a face-to-face physical evaluation by the physician, the diagnosis she receives is both limited and provisional in terms of accuracy and completeness  This is not intended to replace a full medical face-to-face evaluation by the physician  Maylin Kennedy understands and accepts these terms  Male

## 2021-02-09 LAB — SARS-COV-2 RNA RESP QL NAA+PROBE: NEGATIVE

## 2021-02-11 ENCOUNTER — TELEMEDICINE (OUTPATIENT)
Dept: FAMILY MEDICINE CLINIC | Facility: CLINIC | Age: 46
End: 2021-02-11
Payer: COMMERCIAL

## 2021-02-11 VITALS — TEMPERATURE: 97.3 F

## 2021-02-11 DIAGNOSIS — Z20.822 EXPOSURE TO COVID-19 VIRUS: Primary | ICD-10-CM

## 2021-02-11 PROCEDURE — 99213 OFFICE O/P EST LOW 20 MIN: CPT | Performed by: NURSE PRACTITIONER

## 2021-02-11 NOTE — PROGRESS NOTES
COVID-19 Virtual Visit     Assessment/Plan:  1  Exposure to COVID-19 virus  Recent Results (from the past 672 hour(s))   Novel Coronavirus (Covid-19),PCR [facility] - Collected at   [facility]    Collection Time: 02/08/21  3:00 PM    Specimen: Nose; Nares   Result Value Ref Range    SARS-CoV-2 Negative Negative   reviewed CDC recs  Continue quarantine as per guidelines  covid test was negative  Contact office if situation changes or develops any symptoms  Anticipatory guidance  Problem List Items Addressed This Visit     None      Visit Diagnoses     Exposure to COVID-19 virus    -  Primary         Disposition:     I recommended continued isolation until at least 24 hours have passed since recovery defined as resolution of fever without the use of fever-reducing medications AND improvement in COVID symptoms AND 10 days have passed since onset of symptoms (or 10 days have passed since date of first positive viral diagnostic test for asymptomatic patients)  I have spent 10 minutes directly with the patient  Greater than 50% of this time was spent in counseling/coordination of care regarding: diagnostic results, instructions for management, patient and family education, importance of treatment compliance and impressions  Encounter provider ZENOBIA Li    Provider located at [place] [id number]  [address] [address]    Recent Visits  Date Type Provider Dept   02/08/21 ZENOBIA Brian Pg   Showing recent visits within past 7 days and meeting all other requirements     Today's Visits  Date Type Provider Dept   02/11/21 Telemedicine ZENOBIA Li Pg   Showing today's visits and meeting all other requirements     Future Appointments  No visits were found meeting these conditions     Showing future appointments within next 150 days and meeting all other requirements      This virtual check-in was done via [employer] and patient was informed that this is a secure, HIPAA-compliant platform  She agrees to proceed  Patient agrees to participate in a virtual check in via telephone or video visit instead of presenting to the office to address urgent/immediate medical needs  Patient is aware this is a billable service  After connecting through [place], the patient was identified by name and date of birth  Dennis Crum was informed that this was a telemedicine visit and that the exam was being conducted confidentially over secure lines  My office door was closed  No one else was in the room  Dennis Crum acknowledged consent and understanding of privacy and security of the telemedicine visit  I informed the patient that I have reviewed her record in Epic and presented the opportunity for her to ask any questions regarding the visit today  The patient agreed to participate  Subjective:   Dennis Crum is a 39 y o  female who has been screened for COVID-19  Symptom change since last report: unchanged  Patient is currently asymptomatic  Patient denies fever, chills, fatigue, malaise, congestion, rhinorrhea, sore throat, anosmia, loss of taste, cough, shortness of breath, chest tightness, abdominal pain, nausea, vomiting, diarrhea, myalgias and headaches  Brandi Flores has been staying home and has isolated themselves in her home  She is taking care to not share personal items and is cleaning all surfaces that are touched often, like counters, tabletops, and doorknobs using household cleaning sprays or wipes  She is wearing a mask when she leaves her room  Date of exposure: 2/5/2021    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   Several hours but children are not diligent about wearing masks properly  Sent home to quarantine through 2/22  Has been doing so, still feels fine  Tested 2/8, negative      Lab Results   Component Value Date    SARSCOV2 Negative 02/08/2021 [address] 98 Not Detected 12/22/2020     History reviewed  No pertinent past medical history  Past Surgical History:   Procedure Laterality Date    GALLBLADDER SURGERY       No current outpatient medications on file  No current facility-administered medications for this visit  No Known Allergies    Review of Systems   Constitutional: Negative for chills, fatigue and fever  HENT: Negative for congestion, rhinorrhea and sore throat  Respiratory: Negative for cough, chest tightness and shortness of breath  Gastrointestinal: Negative for abdominal pain, diarrhea, nausea and vomiting  Musculoskeletal: Negative for myalgias  Neurological: Negative for headaches  Objective:    Vitals:    02/11/21 0849   Temp: (!) 97 3 °F (36 3 °C)   TempSrc: Oral       Physical Exam  Constitutional:       Appearance: She is not ill-appearing  HENT:      Head: Normocephalic  Eyes:      General: No scleral icterus  Pulmonary:      Effort: Pulmonary effort is normal  No respiratory distress  Skin:     Coloration: Skin is not jaundiced or pale  Neurological:      Mental Status: She is alert and oriented to person, place, and time  Psychiatric:         Mood and Affect: Mood normal          Behavior: Behavior normal          Thought Content: Thought content normal          Judgment: Judgment normal        VIRTUAL VISIT DISCLAIMER    Nica García acknowledges that she has consented to an online visit or consultation  She understands that the online visit is based solely on information provided by her, and that, in the absence of a face-to-face physical evaluation by the physician, the diagnosis she receives is both limited and provisional in terms of accuracy and completeness  This is not intended to replace a full medical face-to-face evaluation by the physician  Sindy Shah understands and accepts these terms

## 2021-03-15 ENCOUNTER — TELEPHONE (OUTPATIENT)
Dept: FAMILY MEDICINE CLINIC | Facility: CLINIC | Age: 46
End: 2021-03-15

## 2021-03-15 NOTE — TELEPHONE ENCOUNTER
Called patient to schedule an appointment  She answered once and the call was disconnected  I called back again and it went to voicemail

## 2021-03-18 ENCOUNTER — OFFICE VISIT (OUTPATIENT)
Dept: FAMILY MEDICINE CLINIC | Facility: CLINIC | Age: 46
End: 2021-03-18
Payer: COMMERCIAL

## 2021-03-18 VITALS
HEART RATE: 66 BPM | SYSTOLIC BLOOD PRESSURE: 118 MMHG | WEIGHT: 133 LBS | RESPIRATION RATE: 16 BRPM | HEIGHT: 61 IN | DIASTOLIC BLOOD PRESSURE: 72 MMHG | OXYGEN SATURATION: 96 % | TEMPERATURE: 96.6 F | BODY MASS INDEX: 25.11 KG/M2

## 2021-03-18 DIAGNOSIS — E55.9 VITAMIN D DEFICIENCY: ICD-10-CM

## 2021-03-18 DIAGNOSIS — Z00.00 ANNUAL PHYSICAL EXAM: Primary | ICD-10-CM

## 2021-03-18 DIAGNOSIS — Z13.220 SCREENING FOR LIPID DISORDERS: ICD-10-CM

## 2021-03-18 DIAGNOSIS — Z12.31 ENCOUNTER FOR SCREENING MAMMOGRAM FOR MALIGNANT NEOPLASM OF BREAST: ICD-10-CM

## 2021-03-18 PROCEDURE — 36415 COLL VENOUS BLD VENIPUNCTURE: CPT | Performed by: NURSE PRACTITIONER

## 2021-03-18 PROCEDURE — 99396 PREV VISIT EST AGE 40-64: CPT | Performed by: NURSE PRACTITIONER

## 2021-03-18 PROCEDURE — 3008F BODY MASS INDEX DOCD: CPT | Performed by: NURSE PRACTITIONER

## 2021-03-18 PROCEDURE — 3725F SCREEN DEPRESSION PERFORMED: CPT | Performed by: NURSE PRACTITIONER

## 2021-03-18 PROCEDURE — 1036F TOBACCO NON-USER: CPT | Performed by: NURSE PRACTITIONER

## 2021-03-18 NOTE — PATIENT INSTRUCTIONS
Heart healthy diet- limit red meat, limit saturated fat, moderate salt intake, limit junk food, etc    Regular exercise  Stress management  Routine labwork and screenings as ordered

## 2021-03-18 NOTE — PROGRESS NOTES
FAMILY [facility] HEALTH MAINTENANCE OFFICE VISIT  [facility]    NAME: Loleta Krabbe  AGE: 39 y o  SEX: female  : 1975     DATE: 3/18/2021    Assessment and Plan     1  Annual physical exam  Heart healthy diet- limit red meat, limit saturated fat, moderate salt intake, limit junk food, etc    Regular exercise  Stress management  Routine labwork and screenings as ordered  - CBC and differential  - Comprehensive metabolic panel  - Lipid panel  - Vitamin D 25 hydroxy  2  Vitamin D deficiency  - CBC and differential  - Comprehensive metabolic panel  - Vitamin D 25 hydroxy  3  Screening for lipid disorders  - Lipid panel  4  Encounter for screening mammogram for malignant neoplasm of breast  - Mammo screening bilateral w 3d & cad; Future      · Patient Counseling:   · Nutrition: Stressed importance of a well balanced diet, moderation of sodium/saturated fat, caloric balance and sufficient intake of fiber  · Exercise: Stressed the importance of regular exercise with a goal of 150 minutes per week  · Dental Health: Discussed daily flossing and brushing and regular dental visits   · Injury Prevention: Discussed Safety Belts, Safety Helmets, and Smoke Detectors    · Immunizations reviewed: Risks and Benefits discussed and Declined recommended vaccinations  · Discussed benefits of:  Mammogram , Cervical Cancer screening and Screening labs   BMI Counseling: Body mass index is 25 13 kg/m²  Discussed with patient's BMI with her  The BMI is above normal  Nutrition recommendations include reducing portion sizes and decreasing overall calorie intake  Exercise recommendations include exercising 3-5 times per week            Chief Complaint     Chief Complaint   Patient presents with    Annual Exam       History of Present Illness     Here for annual physical  No recent illness  No specific concerns or issues        Well Adult Physical   Patient here for a comprehensive physical exam       Diet and Physical Activity  Diet: well balanced diet  Exercise: daily      Depression Screen  PHQ-9 Depression Screening    PHQ-9:   Frequency of the following problems over the past two weeks:      Little interest or pleasure in doing things: 0 - not at all  Feeling down, depressed, or hopeless: 0 - not at all  PHQ-2 Score: 0     Depression Screening Follow-up Plan: Patient's depression screening was negative with a PHQ-2 score of 0    Clinically patient does not have depression  No treatment is required  General Health  Hearing: Normal:  bilateral  Vision: most recent eye exam <1 year, wears glasses and wears contacts  Dental: regular dental visits    Reproductive Health  Follows with gynecologist      The following portions of the patient's history were reviewed and updated as appropriate: allergies, current medications, past family history, past medical history, past social history, past surgical history and problem list     Review of Systems     Review of Systems   Constitutional: Negative for chills, diaphoresis, fatigue and fever  HENT: Negative for congestion, ear pain, sinus pressure, sinus pain and sore throat  Eyes: Negative for visual disturbance  Respiratory: Negative for cough, chest tightness, shortness of breath and wheezing  Cardiovascular: Negative for chest pain, palpitations and leg swelling  Gastrointestinal: Negative for abdominal distention, abdominal pain, diarrhea and nausea  Endocrine: Negative for cold intolerance, heat intolerance, polydipsia, polyphagia and polyuria  Genitourinary: Negative for dysuria, frequency and urgency  Musculoskeletal: Negative for arthralgias, back pain and myalgias  Skin: Negative for color change, pallor, rash and wound  Allergic/Immunologic: Negative for immunocompromised state  Neurological: Negative for dizziness, weakness and headaches  Hematological: Negative for adenopathy     Psychiatric/Behavioral: Negative for agitation  The patient is not nervous/anxious  All other systems reviewed and are negative  Past Medical History     History reviewed  No pertinent past medical history  Past Surgical History     Past Surgical History:   Procedure Laterality Date    GALLBLADDER SURGERY         Social History     Social History     Socioeconomic History    Marital status: /Civil Union     Spouse name: None    Number of children: None    Years of education: None    Highest education level: None   Occupational History    None   Social Needs    Financial resource strain: None    Food insecurity     Worry: None     Inability: None    Transportation needs     Medical: None     Non-medical: None   Tobacco Use    Smoking status: Never Smoker    Smokeless tobacco: Never Used   Substance and Sexual Activity    Alcohol use: Yes     Frequency: Monthly or less    Drug use: No    Sexual activity: Yes     Partners: Male     Birth control/protection: Male Sterilization   Lifestyle    Physical activity     Days per week: None     Minutes per session: None    Stress: None   Relationships    Social connections     Talks on phone: None     Gets together: None     Attends Rastafari service: None     Active member of club or organization: None     Attends meetings of clubs or organizations: None     Relationship status: None    Intimate partner violence     Fear of current or ex partner: None     Emotionally abused: None     Physically abused: None     Forced sexual activity: None   Other Topics Concern    None   Social History Narrative    None       Family History     Family History   Problem Relation Age of Onset    No Known Problems Mother     No Known Problems Father     Substance Abuse Neg Hx     Mental illness Neg Hx        Current Medications     No current outpatient medications on file       Allergies     No Known Allergies    Objective     /72 (BP Location: Right arm, Patient Position: Sitting, Cuff Size: Adult)   Pulse 66   Temp (!) 96 6 °F (35 9 °C) (Temporal)   Resp 16   Ht 5' 1" (1 549 m)   Wt 60 3 kg (133 lb)   SpO2 96%   BMI 25 13 kg/m²      Physical Exam  Vitals signs reviewed  Constitutional:       General: She is not in acute distress  Appearance: Normal appearance  She is well-developed  She is not ill-appearing  HENT:      Head: Normocephalic and atraumatic  Right Ear: Tympanic membrane and ear canal normal       Left Ear: Tympanic membrane and ear canal normal       Nose: Nose normal       Mouth/Throat:      Mouth: Mucous membranes are moist       Pharynx: Oropharynx is clear  Eyes:      General: No scleral icterus  Right eye: No discharge  Left eye: No discharge  Extraocular Movements: Extraocular movements intact  Pupils: Pupils are equal, round, and reactive to light  Neck:      Musculoskeletal: Normal range of motion and neck supple  Thyroid: No thyromegaly  Cardiovascular:      Rate and Rhythm: Normal rate and regular rhythm  Heart sounds: Normal heart sounds  No murmur  Pulmonary:      Effort: Pulmonary effort is normal  No respiratory distress  Breath sounds: Normal breath sounds  No wheezing or rales  Abdominal:      General: Bowel sounds are normal  There is no distension  Palpations: Abdomen is soft  Tenderness: There is no abdominal tenderness  Musculoskeletal: Normal range of motion  Right lower leg: No edema  Left lower leg: No edema  Lymphadenopathy:      Cervical: No cervical adenopathy  Skin:     General: Skin is warm and dry  Coloration: Skin is not jaundiced or pale  Neurological:      General: No focal deficit present  Mental Status: She is alert and oriented to person, place, and time  Cranial Nerves: No cranial nerve deficit  Sensory: No sensory deficit        Gait: Gait normal    Psychiatric:         Mood and Affect: Mood normal  Behavior: Behavior normal          Thought Content:  Thought content normal          Judgment: Judgment normal             Visual Acuity Screening    Right eye Left eye Both eyes   Without correction:      With correction: 20/15 20/13 20/15   Comments: Pt identified colors correctly          Kavin Fritz

## 2021-03-19 LAB
25(OH)D3+25(OH)D2 SERPL-MCNC: 31.7 NG/ML (ref 30–100)
ALBUMIN SERPL-MCNC: 4.2 G/DL (ref 3.8–4.8)
ALBUMIN/GLOB SERPL: 1.7 {RATIO} (ref 1.2–2.2)
ALP SERPL-CCNC: 87 IU/L (ref 39–117)
ALT SERPL-CCNC: 26 IU/L (ref 0–32)
AST SERPL-CCNC: 30 IU/L (ref 0–40)
BASOPHILS # BLD AUTO: 0.1 X10E3/UL (ref 0–0.2)
BASOPHILS NFR BLD AUTO: 1 %
BILIRUB SERPL-MCNC: 0.3 MG/DL (ref 0–1.2)
BUN SERPL-MCNC: 18 MG/DL (ref 6–24)
BUN/CREAT SERPL: 22 (ref 9–23)
CALCIUM SERPL-MCNC: 9.3 MG/DL (ref 8.7–10.2)
CHLORIDE SERPL-SCNC: 104 MMOL/L (ref 96–106)
CHOLEST SERPL-MCNC: 203 MG/DL (ref 100–199)
CHOLEST/HDLC SERPL: 3 RATIO (ref 0–4.4)
CO2 SERPL-SCNC: 26 MMOL/L (ref 20–29)
CREAT SERPL-MCNC: 0.81 MG/DL (ref 0.57–1)
EOSINOPHIL # BLD AUTO: 0.3 X10E3/UL (ref 0–0.4)
EOSINOPHIL NFR BLD AUTO: 6 %
ERYTHROCYTE [DISTWIDTH] IN BLOOD BY AUTOMATED COUNT: 12.7 % (ref 11.7–15.4)
GLOBULIN SER-MCNC: 2.5 G/DL (ref 1.5–4.5)
GLUCOSE SERPL-MCNC: 69 MG/DL (ref 65–99)
HCT VFR BLD AUTO: 40.9 % (ref 34–46.6)
HDLC SERPL-MCNC: 68 MG/DL
HGB BLD-MCNC: 13.6 G/DL (ref 11.1–15.9)
IMM GRANULOCYTES # BLD: 0 X10E3/UL (ref 0–0.1)
IMM GRANULOCYTES NFR BLD: 0 %
LDLC SERPL CALC-MCNC: 116 MG/DL (ref 0–99)
LYMPHOCYTES # BLD AUTO: 1.9 X10E3/UL (ref 0.7–3.1)
LYMPHOCYTES NFR BLD AUTO: 37 %
MCH RBC QN AUTO: 30.3 PG (ref 26.6–33)
MCHC RBC AUTO-ENTMCNC: 33.3 G/DL (ref 31.5–35.7)
MCV RBC AUTO: 91 FL (ref 79–97)
MONOCYTES # BLD AUTO: 0.5 X10E3/UL (ref 0.1–0.9)
MONOCYTES NFR BLD AUTO: 10 %
NEUTROPHILS # BLD AUTO: 2.4 X10E3/UL (ref 1.4–7)
NEUTROPHILS NFR BLD AUTO: 46 %
PLATELET # BLD AUTO: 223 X10E3/UL (ref 150–450)
POTASSIUM SERPL-SCNC: 4.8 MMOL/L (ref 3.5–5.2)
PROT SERPL-MCNC: 6.7 G/DL (ref 6–8.5)
RBC # BLD AUTO: 4.49 X10E6/UL (ref 3.77–5.28)
SL AMB EGFR AFRICAN AMERICAN: 101 ML/MIN/1.73
SL AMB EGFR NON AFRICAN AMERICAN: 88 ML/MIN/1.73
SL AMB VLDL CHOLESTEROL CALC: 19 MG/DL (ref 5–40)
SODIUM SERPL-SCNC: 142 MMOL/L (ref 134–144)
TRIGL SERPL-MCNC: 105 MG/DL (ref 0–149)
WBC # BLD AUTO: 5.2 X10E3/UL (ref 3.4–10.8)

## 2021-04-06 ENCOUNTER — TRANSCRIBE ORDERS (OUTPATIENT)
Dept: ADMINISTRATIVE | Facility: HOSPITAL | Age: 46
End: 2021-04-06

## 2021-04-09 DIAGNOSIS — Z23 ENCOUNTER FOR IMMUNIZATION: ICD-10-CM

## 2021-05-25 ENCOUNTER — HOSPITAL ENCOUNTER (OUTPATIENT)
Dept: RADIOLOGY | Facility: HOSPITAL | Age: 46
Discharge: HOME | End: 2021-05-25
Payer: COMMERCIAL

## 2021-05-25 VITALS — WEIGHT: 133 LBS | HEIGHT: 61 IN | BODY MASS INDEX: 25.11 KG/M2

## 2021-05-25 DIAGNOSIS — Z12.31 ENCOUNTER FOR SCREENING MAMMOGRAM FOR MALIGNANT NEOPLASM OF BREAST: ICD-10-CM

## 2021-05-25 PROCEDURE — 77063 BREAST TOMOSYNTHESIS BI: CPT

## 2021-05-25 PROCEDURE — 77067 SCR MAMMO BI INCL CAD: CPT

## 2021-10-07 ENCOUNTER — APPOINTMENT (OUTPATIENT)
Dept: RADIOLOGY | Facility: CLINIC | Age: 46
End: 2021-10-07
Payer: COMMERCIAL

## 2021-10-07 ENCOUNTER — OFFICE VISIT (OUTPATIENT)
Dept: URGENT CARE | Facility: CLINIC | Age: 46
End: 2021-10-07
Payer: COMMERCIAL

## 2021-10-07 VITALS
TEMPERATURE: 99.5 F | BODY MASS INDEX: 25.52 KG/M2 | SYSTOLIC BLOOD PRESSURE: 120 MMHG | HEIGHT: 60 IN | OXYGEN SATURATION: 99 % | WEIGHT: 130 LBS | HEART RATE: 64 BPM | RESPIRATION RATE: 18 BRPM | DIASTOLIC BLOOD PRESSURE: 70 MMHG

## 2021-10-07 DIAGNOSIS — S99.912A ANKLE INJURY, LEFT, INITIAL ENCOUNTER: ICD-10-CM

## 2021-10-07 DIAGNOSIS — S99.912A ANKLE INJURY, LEFT, INITIAL ENCOUNTER: Primary | ICD-10-CM

## 2021-10-07 PROCEDURE — 3008F BODY MASS INDEX DOCD: CPT | Performed by: FAMILY MEDICINE

## 2021-10-07 PROCEDURE — 3725F SCREEN DEPRESSION PERFORMED: CPT | Performed by: FAMILY MEDICINE

## 2021-10-07 PROCEDURE — 73610 X-RAY EXAM OF ANKLE: CPT

## 2021-10-07 PROCEDURE — 99213 OFFICE O/P EST LOW 20 MIN: CPT | Performed by: FAMILY MEDICINE

## 2021-10-07 PROCEDURE — 1036F TOBACCO NON-USER: CPT | Performed by: FAMILY MEDICINE

## 2021-10-07 RX ORDER — NAPROXEN 500 MG/1
500 TABLET ORAL 2 TIMES DAILY WITH MEALS
Qty: 10 TABLET | Refills: 0 | Status: SHIPPED | OUTPATIENT
Start: 2021-10-07 | End: 2022-03-24 | Stop reason: ALTCHOICE

## 2021-11-30 ENCOUNTER — HOSPITAL ENCOUNTER (EMERGENCY)
Facility: HOSPITAL | Age: 46
Discharge: HOME | End: 2021-11-30
Attending: EMERGENCY MEDICINE
Payer: COMMERCIAL

## 2021-11-30 ENCOUNTER — TELEPHONE (OUTPATIENT)
Dept: FAMILY MEDICINE CLINIC | Facility: CLINIC | Age: 46
End: 2021-11-30

## 2021-11-30 ENCOUNTER — APPOINTMENT (EMERGENCY)
Dept: RADIOLOGY | Facility: HOSPITAL | Age: 46
End: 2021-11-30
Attending: EMERGENCY MEDICINE
Payer: COMMERCIAL

## 2021-11-30 VITALS
HEIGHT: 60 IN | RESPIRATION RATE: 18 BRPM | WEIGHT: 130 LBS | BODY MASS INDEX: 25.52 KG/M2 | HEART RATE: 74 BPM | SYSTOLIC BLOOD PRESSURE: 134 MMHG | DIASTOLIC BLOOD PRESSURE: 80 MMHG | TEMPERATURE: 99.1 F | OXYGEN SATURATION: 100 %

## 2021-11-30 DIAGNOSIS — R60.0 EDEMA OF LEFT LOWER EXTREMITY: Primary | ICD-10-CM

## 2021-11-30 PROCEDURE — 99283 EMERGENCY DEPT VISIT LOW MDM: CPT

## 2021-11-30 PROCEDURE — 93971 EXTREMITY STUDY: CPT

## 2021-11-30 PROCEDURE — 93971 EXTREMITY STUDY: CPT | Performed by: SURGERY

## 2021-11-30 PROCEDURE — 99282 EMERGENCY DEPT VISIT SF MDM: CPT | Performed by: EMERGENCY MEDICINE

## 2021-12-04 ENCOUNTER — OFFICE VISIT (OUTPATIENT)
Dept: OBGYN CLINIC | Facility: CLINIC | Age: 46
End: 2021-12-04
Payer: COMMERCIAL

## 2021-12-04 ENCOUNTER — APPOINTMENT (OUTPATIENT)
Dept: RADIOLOGY | Facility: CLINIC | Age: 46
End: 2021-12-04
Payer: COMMERCIAL

## 2021-12-04 VITALS
HEART RATE: 76 BPM | SYSTOLIC BLOOD PRESSURE: 109 MMHG | DIASTOLIC BLOOD PRESSURE: 70 MMHG | HEIGHT: 60 IN | TEMPERATURE: 96.2 F | BODY MASS INDEX: 25.52 KG/M2 | WEIGHT: 130 LBS

## 2021-12-04 DIAGNOSIS — M79.605 LEFT LEG PAIN: Primary | ICD-10-CM

## 2021-12-04 DIAGNOSIS — M79.605 LEFT LEG PAIN: ICD-10-CM

## 2021-12-04 PROCEDURE — 3008F BODY MASS INDEX DOCD: CPT | Performed by: ORTHOPAEDIC SURGERY

## 2021-12-04 PROCEDURE — 1036F TOBACCO NON-USER: CPT | Performed by: ORTHOPAEDIC SURGERY

## 2021-12-04 PROCEDURE — 73590 X-RAY EXAM OF LOWER LEG: CPT

## 2021-12-04 PROCEDURE — 99204 OFFICE O/P NEW MOD 45 MIN: CPT | Performed by: ORTHOPAEDIC SURGERY

## 2021-12-06 ENCOUNTER — TELEPHONE (OUTPATIENT)
Dept: OBGYN CLINIC | Facility: HOSPITAL | Age: 46
End: 2021-12-06

## 2021-12-09 ENCOUNTER — TELEPHONE (OUTPATIENT)
Dept: OBGYN CLINIC | Facility: CLINIC | Age: 46
End: 2021-12-09

## 2021-12-13 ENCOUNTER — TELEPHONE (OUTPATIENT)
Dept: OBGYN CLINIC | Facility: HOSPITAL | Age: 46
End: 2021-12-13

## 2021-12-16 ENCOUNTER — TELEPHONE (OUTPATIENT)
Dept: OBGYN CLINIC | Facility: CLINIC | Age: 46
End: 2021-12-16

## 2022-01-18 ENCOUNTER — APPOINTMENT (OUTPATIENT)
Dept: RADIOLOGY | Facility: CLINIC | Age: 47
End: 2022-01-18
Payer: COMMERCIAL

## 2022-01-18 ENCOUNTER — OFFICE VISIT (OUTPATIENT)
Dept: OBGYN CLINIC | Facility: CLINIC | Age: 47
End: 2022-01-18
Payer: COMMERCIAL

## 2022-01-18 VITALS
HEART RATE: 61 BPM | BODY MASS INDEX: 25.52 KG/M2 | HEIGHT: 60 IN | DIASTOLIC BLOOD PRESSURE: 68 MMHG | TEMPERATURE: 97.8 F | WEIGHT: 130 LBS | SYSTOLIC BLOOD PRESSURE: 105 MMHG

## 2022-01-18 DIAGNOSIS — M79.672 PAIN IN LEFT FOOT: Primary | ICD-10-CM

## 2022-01-18 DIAGNOSIS — M79.672 PAIN IN LEFT FOOT: ICD-10-CM

## 2022-01-18 DIAGNOSIS — M76.812 ANTERIOR TIBIALIS TENDINITIS OF LEFT LEG: ICD-10-CM

## 2022-01-18 PROCEDURE — 1036F TOBACCO NON-USER: CPT | Performed by: ORTHOPAEDIC SURGERY

## 2022-01-18 PROCEDURE — 3008F BODY MASS INDEX DOCD: CPT | Performed by: ORTHOPAEDIC SURGERY

## 2022-01-18 PROCEDURE — 99214 OFFICE O/P EST MOD 30 MIN: CPT | Performed by: ORTHOPAEDIC SURGERY

## 2022-01-18 PROCEDURE — 73630 X-RAY EXAM OF FOOT: CPT

## 2022-01-18 NOTE — PROGRESS NOTES
Assessment/Plan:  1  Pain in left foot  XR foot 3+ vw left   2  Anterior tibialis tendinitis of left leg       Chey Bueno has pain in her left foot and tenderness along the medial aspect of the arch  She does not have any abnormality on her x-ray  Her MRI did show anterior tibialis tenosynovitis but no evidence of stress injury  I recommended conservative measures of activity modification and rest from walking as well as possible formal physical therapy on her left foot and ankle  She will follow up after therapy if the pain persists or worsens  Subjective:   Nader Stephens is a 55 y o  female who presents to the office for follow-up for left-sided leg pain  She initially presented to the office in October of last year with ongoing shin pain and suspicion for possible stress injury  We sent her for an MRI at that time as she was walking 7 miles a day  The MRI did not show a stress fracture in showed tendinitis of her anterior tibialis tendon  She states she is now feeling less pain in the ankle and shin but more discomfort over the medial aspect of her left foot  She denies any new injury or trauma  She denies any swelling or bruising  This will bother her with lot of walking  She denies any other new pain  Review of Systems   Constitutional: Negative for chills, fever and unexpected weight change  HENT: Negative for hearing loss, nosebleeds and sore throat  Eyes: Negative for pain, redness and visual disturbance  Respiratory: Negative for cough, shortness of breath and wheezing  Cardiovascular: Negative for chest pain, palpitations and leg swelling  Gastrointestinal: Negative for abdominal pain, nausea and vomiting  Endocrine: Negative for polydipsia and polyuria  Genitourinary: Negative for dysuria and hematuria  Musculoskeletal:        See HPI   Skin: Negative for rash and wound  Neurological: Negative for dizziness, numbness and headaches     Psychiatric/Behavioral: Negative for decreased concentration and suicidal ideas  The patient is not nervous/anxious  Past Medical History:   Diagnosis Date    No known health problems        Past Surgical History:   Procedure Laterality Date    CHOLECYSTECTOMY      GALLBLADDER SURGERY      WISDOM TOOTH EXTRACTION         Family History   Problem Relation Age of Onset    No Known Problems Mother     No Known Problems Father     No Known Problems Sister     No Known Problems Daughter     No Known Problems Paternal Aunt     No Known Problems Paternal Aunt     Substance Abuse Neg Hx     Mental illness Neg Hx        Social History     Occupational History    Not on file   Tobacco Use    Smoking status: Never Smoker    Smokeless tobacco: Never Used   Vaping Use    Vaping Use: Never used   Substance and Sexual Activity    Alcohol use: Yes     Comment: social    Drug use: No    Sexual activity: Yes     Partners: Male     Birth control/protection: Male Sterilization         Current Outpatient Medications:     naproxen (EC NAPROSYN) 500 MG EC tablet, Take 1 tablet (500 mg total) by mouth 2 (two) times a day with meals for 5 days, Disp: 10 tablet, Rfl: 0    No Known Allergies    Objective:  Vitals:    01/18/22 1601   BP: 105/68   Pulse: 61   Temp: 97 8 °F (36 6 °C)       Left Ankle Exam     Tenderness   Left ankle tenderness location: mild pain over anterior tibialis tendon  Range of Motion   Dorsiflexion: normal   Plantar flexion: normal   Eversion: normal   Inversion: normal     Muscle Strength   Dorsiflexion:  5/5   Plantar flexion:  5/5   Anterior tibial:  5/5   Posterior tibial:  5/5  Gastrocsoleus:  5/5  Peroneal muscle:  5/5    Other   Erythema: absent  Sensation: normal  Pulse: present          Strength/Myotome Testing     Left Ankle/Foot   Dorsiflexion: 5  Plantar flexion: 5      Physical Exam  Vitals reviewed  Constitutional:       Appearance: She is well-developed  HENT:      Head: Normocephalic and atraumatic  Eyes:      Conjunctiva/sclera: Conjunctivae normal       Pupils: Pupils are equal, round, and reactive to light  Cardiovascular:      Rate and Rhythm: Normal rate  Pulmonary:      Effort: Pulmonary effort is normal  No respiratory distress  Musculoskeletal:      Cervical back: Normal range of motion and neck supple  Feet:    Skin:     General: Skin is warm and dry  Neurological:      Mental Status: She is alert and oriented to person, place, and time  Psychiatric:         Behavior: Behavior normal          I have personally reviewed pertinent films in PACS and my interpretation is as follows: Three-view x-ray of the left foot demonstrates no evidence of acute fracture or significant degenerative change

## 2022-03-11 ENCOUNTER — TELEPHONE (OUTPATIENT)
Dept: FAMILY MEDICINE CLINIC | Facility: CLINIC | Age: 47
End: 2022-03-11

## 2022-03-11 NOTE — TELEPHONE ENCOUNTER
JAMSHID to inform patient she needs her labs done prior to her appointment on 3/25  Advised her they were put in the computer and I also am mailing her copies      Elfrieda Eisenmenger Soft, nontender

## 2022-03-18 ENCOUNTER — APPOINTMENT (OUTPATIENT)
Dept: LAB | Facility: CLINIC | Age: 47
End: 2022-03-18
Payer: COMMERCIAL

## 2022-03-18 DIAGNOSIS — Z00.00 ROUTINE GENERAL MEDICAL EXAMINATION AT A HEALTH CARE FACILITY: ICD-10-CM

## 2022-03-18 DIAGNOSIS — E78.2 MIXED HYPERLIPIDEMIA: ICD-10-CM

## 2022-03-18 DIAGNOSIS — Z11.59 SPECIAL SCREENING EXAMINATION FOR VIRAL DISEASE: ICD-10-CM

## 2022-03-18 DIAGNOSIS — E55.9 VITAMIN D DEFICIENCY DISEASE: ICD-10-CM

## 2022-03-18 LAB
25(OH)D3 SERPL-MCNC: 30.1 NG/ML (ref 30–100)
ALBUMIN SERPL BCP-MCNC: 3.8 G/DL (ref 3.5–5)
ALP SERPL-CCNC: 81 U/L (ref 46–116)
ALT SERPL W P-5'-P-CCNC: 38 U/L (ref 12–78)
ANION GAP SERPL CALCULATED.3IONS-SCNC: 4 MMOL/L (ref 4–13)
AST SERPL W P-5'-P-CCNC: 41 U/L (ref 5–45)
BASOPHILS # BLD AUTO: 0.04 THOUSANDS/ΜL (ref 0–0.1)
BASOPHILS NFR BLD AUTO: 1 % (ref 0–1)
BILIRUB SERPL-MCNC: 0.28 MG/DL (ref 0.2–1)
BUN SERPL-MCNC: 14 MG/DL (ref 5–25)
CALCIUM SERPL-MCNC: 9.2 MG/DL (ref 8.3–10.1)
CHLORIDE SERPL-SCNC: 109 MMOL/L (ref 100–108)
CHOLEST SERPL-MCNC: 217 MG/DL
CO2 SERPL-SCNC: 29 MMOL/L (ref 21–32)
CREAT SERPL-MCNC: 0.92 MG/DL (ref 0.6–1.3)
EOSINOPHIL # BLD AUTO: 0.45 THOUSAND/ΜL (ref 0–0.61)
EOSINOPHIL NFR BLD AUTO: 9 % (ref 0–6)
ERYTHROCYTE [DISTWIDTH] IN BLOOD BY AUTOMATED COUNT: 13.2 % (ref 11.6–15.1)
GFR SERPL CREATININE-BSD FRML MDRD: 74 ML/MIN/1.73SQ M
GLUCOSE P FAST SERPL-MCNC: 95 MG/DL (ref 65–99)
HCT VFR BLD AUTO: 41.3 % (ref 34.8–46.1)
HCV AB SER QL: NORMAL
HDLC SERPL-MCNC: 60 MG/DL
HGB BLD-MCNC: 13.6 G/DL (ref 11.5–15.4)
IMM GRANULOCYTES # BLD AUTO: 0.01 THOUSAND/UL (ref 0–0.2)
IMM GRANULOCYTES NFR BLD AUTO: 0 % (ref 0–2)
LDLC SERPL CALC-MCNC: 134 MG/DL (ref 0–100)
LYMPHOCYTES # BLD AUTO: 1.82 THOUSANDS/ΜL (ref 0.6–4.47)
LYMPHOCYTES NFR BLD AUTO: 37 % (ref 14–44)
MCH RBC QN AUTO: 29.8 PG (ref 26.8–34.3)
MCHC RBC AUTO-ENTMCNC: 32.9 G/DL (ref 31.4–37.4)
MCV RBC AUTO: 90 FL (ref 82–98)
MONOCYTES # BLD AUTO: 0.52 THOUSAND/ΜL (ref 0.17–1.22)
MONOCYTES NFR BLD AUTO: 11 % (ref 4–12)
NEUTROPHILS # BLD AUTO: 2.09 THOUSANDS/ΜL (ref 1.85–7.62)
NEUTS SEG NFR BLD AUTO: 42 % (ref 43–75)
NONHDLC SERPL-MCNC: 157 MG/DL
NRBC BLD AUTO-RTO: 0 /100 WBCS
PLATELET # BLD AUTO: 205 THOUSANDS/UL (ref 149–390)
PMV BLD AUTO: 11 FL (ref 8.9–12.7)
POTASSIUM SERPL-SCNC: 3.7 MMOL/L (ref 3.5–5.3)
PROT SERPL-MCNC: 6.9 G/DL (ref 6.4–8.2)
RBC # BLD AUTO: 4.57 MILLION/UL (ref 3.81–5.12)
SODIUM SERPL-SCNC: 142 MMOL/L (ref 136–145)
TRIGL SERPL-MCNC: 114 MG/DL
WBC # BLD AUTO: 4.93 THOUSAND/UL (ref 4.31–10.16)

## 2022-03-18 PROCEDURE — 82306 VITAMIN D 25 HYDROXY: CPT

## 2022-03-18 PROCEDURE — 80061 LIPID PANEL: CPT

## 2022-03-18 PROCEDURE — 36415 COLL VENOUS BLD VENIPUNCTURE: CPT

## 2022-03-18 PROCEDURE — 80053 COMPREHEN METABOLIC PANEL: CPT

## 2022-03-18 PROCEDURE — 86803 HEPATITIS C AB TEST: CPT

## 2022-03-18 PROCEDURE — 85025 COMPLETE CBC W/AUTO DIFF WBC: CPT

## 2022-03-24 ENCOUNTER — OFFICE VISIT (OUTPATIENT)
Dept: FAMILY MEDICINE CLINIC | Facility: CLINIC | Age: 47
End: 2022-03-24
Payer: COMMERCIAL

## 2022-03-24 VITALS
TEMPERATURE: 97.1 F | OXYGEN SATURATION: 98 % | DIASTOLIC BLOOD PRESSURE: 78 MMHG | HEIGHT: 60 IN | BODY MASS INDEX: 26.11 KG/M2 | WEIGHT: 133 LBS | SYSTOLIC BLOOD PRESSURE: 120 MMHG | HEART RATE: 60 BPM

## 2022-03-24 DIAGNOSIS — Z12.31 ENCOUNTER FOR SCREENING MAMMOGRAM FOR MALIGNANT NEOPLASM OF BREAST: ICD-10-CM

## 2022-03-24 DIAGNOSIS — Z00.00 ANNUAL PHYSICAL EXAM: Primary | ICD-10-CM

## 2022-03-24 PROBLEM — E78.2 MIXED HYPERLIPIDEMIA: Status: ACTIVE | Noted: 2022-03-24

## 2022-03-24 PROCEDURE — 99396 PREV VISIT EST AGE 40-64: CPT | Performed by: NURSE PRACTITIONER

## 2022-03-24 PROCEDURE — 1036F TOBACCO NON-USER: CPT | Performed by: NURSE PRACTITIONER

## 2022-03-24 PROCEDURE — 3008F BODY MASS INDEX DOCD: CPT | Performed by: NURSE PRACTITIONER

## 2022-03-24 PROCEDURE — 3725F SCREEN DEPRESSION PERFORMED: CPT | Performed by: NURSE PRACTITIONER

## 2022-03-24 NOTE — PATIENT INSTRUCTIONS
Heart healthy, carbohydrate controlled diet- limit red meat, limit saturated fat, moderate salt intake, limit junk food, etc    Regular exercise  Stress management  Routine labwork and screenings as ordered  Vitamin D3 1000 units per day   Calcium 1000-1200mg in divided doses (ie: 500mg twice daily)  Mammogram to be scheduled after 5/25/2022

## 2022-03-24 NOTE — PROGRESS NOTES
FAMILY PRACTICE HEALTH MAINTENANCE OFFICE VISIT  [facility] - [facility]    NAME: [facility][note 3]  AGE: 55 y o  SEX: female  : 1975     DATE: 3/24/2022    Assessment and Plan     1  Annual physical exam  Heart healthy, carbohydrate controlled diet- limit red meat, limit saturated fat, moderate salt intake, limit junk food, etc    Regular exercise  Stress management  Routine labwork and screenings as ordered  Vitamin D3 1000 units per day  Calcium 1000-1200mg in divided doses (ie: 500mg twice daily)  Mammogram to be scheduled after 2022  2  Encounter for screening mammogram for malignant neoplasm of breast  - Mammo screening bilateral w 3d & cad; Future      · Patient Counseling:   · Nutrition: Stressed importance of a well balanced diet, moderation of sodium/saturated fat, caloric balance and sufficient intake of fiber  · Exercise: Stressed the importance of regular exercise with a goal of 150 minutes per week  · Dental Health: Discussed daily flossing and brushing and regular dental visits   · Alcohol Use:  Recommended moderation of alcohol intake  · Injury Prevention: Discussed Safety Belts, Safety Helmets, and Smoke Detectors    · Immunizations reviewed: Risks and Benefits discussed  · Discussed benefits of:  Mammogram , Cervical Cancer screening and Screening labs   BMI Counseling: Body mass index is 25 93 kg/m²  Discussed with patient's BMI with her  The BMI is above normal  Nutrition recommendations include reducing portion sizes  Exercise recommendations include exercising 3-5 times per week  Chief Complaint     Chief Complaint   Patient presents with    Physical Exam       History of Present Illness     Here for annual exam  Feels well  No recent illness  No specific concerns or issues           Well Adult Physical   Patient here for a comprehensive physical exam       Diet and Physical Activity  Diet: well balanced diet  Exercise: daily Depression Screen  PHQ-2/9 Depression Screening    Little interest or pleasure in doing things: 0 - not at all  Feeling down, depressed, or hopeless: 0 - not at all  PHQ-2 Score: 0  PHQ-2 Interpretation: Negative depression screen     Depression Screening Follow-up Plan: Patient's depression screening was negative with a PHQ-2 score of 0    Clinically patient does not have depression  No treatment is required  General Health  Hearing: Normal:  bilateral  Vision: goes for regular eye exams, most recent eye exam <1 year and wears contacts  Dental: regular dental visits    Reproductive Health  No issues  and Follows with gynecologist      The following portions of the patient's history were reviewed and updated as appropriate: allergies, current medications, past family history, past medical history, past social history, past surgical history and problem list     Review of Systems     Review of Systems   Constitutional: Negative for chills, diaphoresis, fatigue and fever  HENT: Negative for congestion, ear pain, sinus pressure, sinus pain and sore throat  Eyes: Negative for visual disturbance  Respiratory: Negative for cough, chest tightness, shortness of breath and wheezing  Cardiovascular: Negative for chest pain, palpitations and leg swelling  Gastrointestinal: Negative for abdominal distention, abdominal pain, diarrhea and nausea  Endocrine: Negative for cold intolerance, heat intolerance, polydipsia, polyphagia and polyuria  Genitourinary: Negative for dysuria, frequency and urgency  Musculoskeletal: Negative for arthralgias, back pain and myalgias  Skin: Negative for color change, pallor, rash and wound  Allergic/Immunologic: Negative for immunocompromised state  Neurological: Negative for dizziness, weakness and headaches  Hematological: Negative for adenopathy  Psychiatric/Behavioral: Negative for agitation  The patient is not nervous/anxious      All other systems reviewed and are negative        Past Medical History     Past Medical History:   Diagnosis Date    No known health problems        Past Surgical History     Past Surgical History:   Procedure Laterality Date    CHOLECYSTECTOMY      GALLBLADDER SURGERY      WISDOM TOOTH EXTRACTION         Social History     Social History     Socioeconomic History    Marital status: /Civil Union     Spouse name: None    Number of children: None    Years of education: None    Highest education level: None   Occupational History    None   Tobacco Use    Smoking status: Never Smoker    Smokeless tobacco: Never Used   Vaping Use    Vaping Use: Never used   Substance and Sexual Activity    Alcohol use: Yes     Comment: social    Drug use: No    Sexual activity: Yes     Partners: Male     Birth control/protection: Male Sterilization   Other Topics Concern    None   Social History Narrative    None     Social Determinants of Health     Financial Resource Strain: Not on file   Food Insecurity: Not on file   Transportation Needs: Not on file   Physical Activity: Not on file   Stress: Not on file   Social Connections: Not on file   Intimate Partner Violence: Not on file   Housing Stability: Not on file       Family History     Family History   Problem Relation Age of Onset    No Known Problems Mother     No Known Problems Father     No Known Problems Sister     No Known Problems Daughter     No Known Problems Paternal Aunt     No Known Problems Paternal Aunt     Substance Abuse Neg Hx     Mental illness Neg Hx        Current Medications       Current Outpatient Medications:     naproxen (EC NAPROSYN) 500 MG EC tablet, Take 1 tablet (500 mg total) by mouth 2 (two) times a day with meals for 5 days, Disp: 10 tablet, Rfl: 0     Allergies     No Known Allergies    Objective     Recent Results (from the past 672 hour(s))   Hepatitis C antibody    Collection Time: 03/18/22  7:08 AM   Result Value Ref Range    Hepatitis C Ab Non-reactive Non-reactive   Vitamin D 25 hydroxy    Collection Time: 03/18/22  7:08 AM   Result Value Ref Range    Vit D, 25-Hydroxy 30 1 30 0 - 100 0 ng/mL   Lipid panel    Collection Time: 03/18/22  7:08 AM   Result Value Ref Range    Cholesterol 217 (H) See Comment mg/dL    Triglycerides 114 See Comment mg/dL    HDL, Direct 60 >=50 mg/dL    LDL Calculated 134 (H) 0 - 100 mg/dL    Non-HDL-Chol (CHOL-HDL) 157 mg/dl   Comprehensive metabolic panel    Collection Time: 03/18/22  7:08 AM   Result Value Ref Range    Sodium 142 136 - 145 mmol/L    Potassium 3 7 3 5 - 5 3 mmol/L    Chloride 109 (H) 100 - 108 mmol/L    CO2 29 21 - 32 mmol/L    ANION GAP 4 4 - 13 mmol/L    BUN 14 5 - 25 mg/dL    Creatinine 0 92 0 60 - 1 30 mg/dL    Glucose, Fasting 95 65 - 99 mg/dL    Calcium 9 2 8 3 - 10 1 mg/dL    AST 41 5 - 45 U/L    ALT 38 12 - 78 U/L    Alkaline Phosphatase 81 46 - 116 U/L    Total Protein 6 9 6 4 - 8 2 g/dL    Albumin 3 8 3 5 - 5 0 g/dL    Total Bilirubin 0 28 0 20 - 1 00 mg/dL    eGFR 74 ml/min/1 73sq m   CBC and differential    Collection Time: 03/18/22  7:08 AM   Result Value Ref Range    WBC 4 93 4 31 - 10 16 Thousand/uL    RBC 4 57 3 81 - 5 12 Million/uL    Hemoglobin 13 6 11 5 - 15 4 g/dL    Hematocrit 41 3 34 8 - 46 1 %    MCV 90 82 - 98 fL    MCH 29 8 26 8 - 34 3 pg    MCHC 32 9 31 4 - 37 4 g/dL    RDW 13 2 11 6 - 15 1 %    MPV 11 0 8 9 - 12 7 fL    Platelets 316 521 - 554 Thousands/uL    nRBC 0 /100 WBCs    Neutrophils Relative 42 (L) 43 - 75 %    Immat GRANS % 0 0 - 2 %    Lymphocytes Relative 37 14 - 44 %    Monocytes Relative 11 4 - 12 %    Eosinophils Relative 9 (H) 0 - 6 %    Basophils Relative 1 0 - 1 %    Neutrophils Absolute 2 09 1 85 - 7 62 Thousands/µL    Immature Grans Absolute 0 01 0 00 - 0 20 Thousand/uL    Lymphocytes Absolute 1 82 0 60 - 4 47 Thousands/µL    Monocytes Absolute 0 52 0 17 - 1 22 Thousand/µL    Eosinophils Absolute 0 45 0 00 - 0 61 Thousand/µL    Basophils Absolute 0 04 0 00 - 0 10 Thousands/µL     Reviewed lab/diagnostic results with pt including both normal and abnormal findings  In depth counseling and instructions given  All questions answered during visit  /78   Pulse 60   Temp (!) 97 1 °F (36 2 °C) (Temporal)   Ht 5' 0 05" (1 525 m)   Wt 60 3 kg (133 lb)   LMP 01/13/2020 (Approximate)   SpO2 98%   BMI 25 93 kg/m²      Physical Exam  Vitals reviewed  Constitutional:       General: She is not in acute distress  Appearance: Normal appearance  She is well-developed  She is not ill-appearing  HENT:      Head: Normocephalic and atraumatic  Right Ear: Tympanic membrane and ear canal normal       Left Ear: Tympanic membrane and ear canal normal       Nose: Nose normal       Mouth/Throat:      Mouth: Mucous membranes are moist       Pharynx: Oropharynx is clear  Eyes:      General: No scleral icterus  Extraocular Movements: Extraocular movements intact  Pupils: Pupils are equal, round, and reactive to light  Neck:      Thyroid: No thyromegaly  Cardiovascular:      Rate and Rhythm: Normal rate and regular rhythm  Heart sounds: Normal heart sounds  No murmur heard  Pulmonary:      Effort: Pulmonary effort is normal  No respiratory distress  Breath sounds: Normal breath sounds  No wheezing or rales  Abdominal:      General: Bowel sounds are normal  There is no distension  Palpations: Abdomen is soft  Tenderness: There is no abdominal tenderness  Musculoskeletal:         General: Normal range of motion  Cervical back: Normal range of motion and neck supple  Right lower leg: No edema  Left lower leg: No edema  Lymphadenopathy:      Cervical: No cervical adenopathy  Skin:     General: Skin is warm and dry  Coloration: Skin is not jaundiced or pale  Neurological:      General: No focal deficit present  Mental Status: She is alert and oriented to person, place, and time  Cranial Nerves:  No cranial nerve deficit  Sensory: No sensory deficit  Psychiatric:         Mood and Affect: Mood normal          Behavior: Behavior normal          Thought Content:  Thought content normal          Judgment: Judgment normal             Visual Acuity Screening    Right eye Left eye Both eyes   Without correction:      With correction: 20/20 20/20 20/20   Comments: Per patient          Kavin Mendoza

## 2022-05-26 ENCOUNTER — HOSPITAL ENCOUNTER (OUTPATIENT)
Dept: RADIOLOGY | Facility: HOSPITAL | Age: 47
Discharge: HOME | End: 2022-05-26
Payer: COMMERCIAL

## 2022-05-26 VITALS — BODY MASS INDEX: 25.52 KG/M2 | HEIGHT: 60 IN | WEIGHT: 130 LBS

## 2022-05-26 DIAGNOSIS — Z12.31 ENCOUNTER FOR SCREENING MAMMOGRAM FOR MALIGNANT NEOPLASM OF BREAST: ICD-10-CM

## 2022-05-26 PROCEDURE — 77063 BREAST TOMOSYNTHESIS BI: CPT

## 2022-05-26 PROCEDURE — 77067 SCR MAMMO BI INCL CAD: CPT

## 2022-10-04 ENCOUNTER — OFFICE VISIT (OUTPATIENT)
Dept: FAMILY MEDICINE CLINIC | Facility: CLINIC | Age: 47
End: 2022-10-04
Payer: COMMERCIAL

## 2022-10-04 VITALS
BODY MASS INDEX: 26.5 KG/M2 | WEIGHT: 135 LBS | OXYGEN SATURATION: 99 % | RESPIRATION RATE: 16 BRPM | DIASTOLIC BLOOD PRESSURE: 66 MMHG | SYSTOLIC BLOOD PRESSURE: 108 MMHG | HEIGHT: 60 IN | HEART RATE: 78 BPM | TEMPERATURE: 97.2 F

## 2022-10-04 DIAGNOSIS — J01.40 ACUTE NON-RECURRENT PANSINUSITIS: Primary | ICD-10-CM

## 2022-10-04 PROCEDURE — 99214 OFFICE O/P EST MOD 30 MIN: CPT | Performed by: NURSE PRACTITIONER

## 2022-10-04 RX ORDER — LEVOFLOXACIN 500 MG/1
500 TABLET, FILM COATED ORAL EVERY 24 HOURS
Qty: 10 TABLET | Refills: 0 | Status: SHIPPED | OUTPATIENT
Start: 2022-10-04 | End: 2022-10-14

## 2022-10-04 RX ORDER — ALBUTEROL SULFATE 90 UG/1
AEROSOL, METERED RESPIRATORY (INHALATION)
COMMUNITY
Start: 2022-09-25 | End: 2022-10-04 | Stop reason: ALTCHOICE

## 2022-10-04 RX ORDER — FLUTICASONE PROPIONATE 50 MCG
1 SPRAY, SUSPENSION (ML) NASAL DAILY
Qty: 16 G | Refills: 0 | Status: CANCELLED | OUTPATIENT
Start: 2022-10-04

## 2022-10-04 RX ORDER — CLARITHROMYCIN 500 MG/1
500 TABLET, COATED ORAL EVERY 12 HOURS SCHEDULED
Qty: 14 TABLET | Refills: 0 | Status: CANCELLED | OUTPATIENT
Start: 2022-10-04 | End: 2022-10-11

## 2022-10-04 RX ORDER — PREDNISONE 20 MG/1
20 TABLET ORAL DAILY
Qty: 5 TABLET | Refills: 0 | Status: SHIPPED | OUTPATIENT
Start: 2022-10-04 | End: 2022-10-09

## 2022-10-04 NOTE — PROGRESS NOTES
Name: Belkys Serrano      : 1975      MRN: [id number]  Encounter Provider: ZENOBIA Hardy  Encounter Date: 10/4/2022   Encounter department: [address] [place] [address]  Acute non-recurrent pansinusitis  Fluids  Rest  Nasal saline rinses  Symptom management for supportive care such as decongestants, tylenol/motrin as needed for fever or discomfort  Use a cool mist humidifier at bedtime  Flonase as directed  Finish antibiotics as prescribed  Levaquin 500mg daily for 10 days  Prednisone 20mg daily with food for 5 days  Recommend taking probiotic while taking antibiotics to minimize gastrointestinal side effects  Warm compresses to facilitate nasal drainage  Call or return to office if symptoms worsen or if new symptoms develop  - levofloxacin (LEVAQUIN) 500 mg tablet; Take 1 tablet (500 mg total) by mouth every 24 hours for 10 days  Dispense: 10 tablet; Refill: 0  - predniSONE 20 mg tablet; Take 1 tablet (20 mg total) by mouth daily for 5 days  Dispense: 5 tablet; Refill: 0       Patient was counseled regarding instructions for management which included: impression/diagnosis, risk/benefits of treatment options, importance of compliance with treatment, risk factor reduction, and prognosis  I have reviewed the instructions with the patient answering all questions and patient verbalized understanding  Subjective      Here for "sinus infection"  Symptoms for about 4 weeks  Took otc decongestants initially but got worse  Seen in urgent care about 2 weeks ago , diagnosed with bronchitis and sinus infection, and treated with augmentin  No improvement  Still with head pressure, congestion, headache, feels feverish  Has been using nasal irrigation but cant get the saline to go in because "swollen inside nose"  Has been using flonase  Cough resolved  Pain entire face, teeth hurt         Review of Systems   Constitutional: Negative for fever (feels feverish but not documented fever)  HENT: Positive for congestion, sinus pressure and sinus pain  Negative for sore throat  Eyes: Negative for discharge  Respiratory: Negative for cough and shortness of breath  Cardiovascular: Negative for chest pain and palpitations  Allergic/Immunologic: Negative for immunocompromised state  Neurological: Positive for headaches  Negative for dizziness and light-headedness  Current Outpatient Medications on File Prior to Visit   Medication Sig    [DISCONTINUED] albuterol (PROVENTIL HFA,VENTOLIN HFA) 90 mcg/act inhaler inhale 2 puffs by mouth and INTO THE LUNGS every 4 to 6 hours if needed (Patient not taking: Reported on 10/4/2022)       Objective     /66   Pulse 78   Temp (!) 97 2 °F (36 2 °C) (Temporal)   Resp 16   Ht 5' (1 524 m)   Wt 61 2 kg (135 lb)   LMP 01/13/2020 (Approximate)   SpO2 99%   BMI 26 37 kg/m²     Physical Exam  Constitutional:       Appearance: She is ill-appearing  HENT:      Right Ear: Tympanic membrane and ear canal normal       Left Ear: Tympanic membrane and ear canal normal       Nose: Congestion present  Comments: Turbinates significantly edematous and erythematous  (+) facial edema under both eyes  Pain with palpation over maxillary and frontal sinus area       Mouth/Throat:      Mouth: Mucous membranes are moist       Pharynx: Oropharynx is clear  Eyes:      General:         Right eye: No discharge  Left eye: No discharge  Cardiovascular:      Rate and Rhythm: Normal rate and regular rhythm  Pulmonary:      Effort: Pulmonary effort is normal  No respiratory distress  Breath sounds: Normal breath sounds  No wheezing  Lymphadenopathy:      Cervical: Cervical adenopathy (mild, bilateral) present  Skin:     General: Skin is warm  Coloration: Skin is not jaundiced or pale  Neurological:      Mental Status: She is alert and oriented to person, place, and time     Psychiatric:         Mood and Affect: Mood normal          Behavior: Behavior normal        ZENOBIA Sandoval

## 2022-10-04 NOTE — PATIENT INSTRUCTIONS
Fluids  Rest  Nasal saline rinses  Symptom management for supportive care such as decongestants, tylenol/motrin as needed for fever or discomfort  Use a cool mist humidifier at bedtime  Flonase as directed  Finish antibiotics as prescribed  Levaquin 500mg daily for 10 days  Prednisone 20mg daily with food for 5 days  Recommend taking probiotic while taking antibiotics to minimize gastrointestinal side effects  Warm compresses to facilitate nasal drainage  Call or return to office if symptoms worsen or if new symptoms develop

## 2022-10-05 ENCOUNTER — TELEPHONE (OUTPATIENT)
Dept: ADMINISTRATIVE | Facility: OTHER | Age: 47
End: 2022-10-05

## 2022-10-05 NOTE — TELEPHONE ENCOUNTER
----- Message from [facility], LPN sent at 96/1/3134  3:42 PM EDT -----  Regarding: pap  10/04/22 3:42 PM    Hello, our patient Cris Navarro has had Pap Smear (HPV) aka Cervical Cancer Screening completed/performed  Please assist in updating the patient chart by making an External outreach to [facility]   facility located in [address] [place] [address]  The date of service is 8/22      Thank you,  ugo CANNON

## 2022-10-05 NOTE — TELEPHONE ENCOUNTER
Upon review of the In Basket request and the patient's chart, initial outreach has been made via fax, please see Contacts section for details       Thank you  Adia Romano

## 2022-10-05 NOTE — LETTER
Procedure Request Form: Cervical Cancer Screening      Date Requested: 10/05/22  Patient: Juliana Martin  Patient : 1975   Referring Provider: Muna Aldana, ZENOBIA        Date of Procedure ______________________________       The above patient has informed us that they have completed their   most recent Cervical Cancer Screening at your facility  Please complete   this form and attach all corresponding procedure reports/results  Comments __________________________________________________________  ____________________________________________________________________  ____________________________________________________________________  ____________________________________________________________________    Facility Completing Procedure _________________________________________    Form Completed By (print name) _______________________________________      Signature __________________________________________________________      These reports are needed for  compliance  Please fax this completed form and a copy of the procedure report to our office located at [place] [address] as soon as possible to [phone] attention Adia: Phone [phone]    We thank you for your assistance in treating our mutual patient     (sent to [facility] [address])

## 2022-10-07 NOTE — TELEPHONE ENCOUNTER
Upon review of the In Basket request we were able to locate, review, and update the patient chart as requested for Pap Smear (HPV) aka Cervical Cancer Screening  Any additional questions or concerns should be emailed to the Practice Liaisons via the appropriate education email address, please do not reply via In Basket      Thank you  Adia Romano

## 2023-03-14 ENCOUNTER — TELEPHONE (OUTPATIENT)
Dept: FAMILY MEDICINE CLINIC | Facility: CLINIC | Age: 48
End: 2023-03-14

## 2023-03-14 DIAGNOSIS — Z00.00 ANNUAL PHYSICAL EXAM: ICD-10-CM

## 2023-03-14 DIAGNOSIS — E55.9 VITAMIN D DEFICIENCY: Primary | ICD-10-CM

## 2023-03-14 DIAGNOSIS — E78.2 MIXED HYPERLIPIDEMIA: ICD-10-CM

## 2023-03-14 DIAGNOSIS — Z13.1 SCREENING FOR DIABETES MELLITUS (DM): ICD-10-CM

## 2023-03-14 NOTE — TELEPHONE ENCOUNTER
----- Message from [address] [place] [address] sent at 3/14/2023 11:33 AM EDT -----  Regarding: labs  Pt has upcoming appt for physical on 3/28  Needs to have labs done prior to appt  Orders in chart  Please call pt to advise  If labs are not done, appt will need to be re-scheduled  TY     for pt to have labs done prior to appt

## 2023-03-27 ENCOUNTER — TELEPHONE (OUTPATIENT)
Dept: FAMILY MEDICINE CLINIC | Facility: CLINIC | Age: 48
End: 2023-03-27

## 2023-03-27 NOTE — TELEPHONE ENCOUNTER
----- Message from [address] [place] [address] sent at 3/27/2023  8:40 AM EDT -----  Regarding: labs  Pt has upcoming appt on 4/6 for physical  Needs to have labs done prior to appt  Orders in chart  Please call pt to advise  If labs are not done, appt will need to be re-scheduled   TY    Pt advised

## 2023-04-04 ENCOUNTER — TELEPHONE (OUTPATIENT)
Dept: FAMILY MEDICINE CLINIC | Facility: CLINIC | Age: 48
End: 2023-04-04

## 2023-04-04 NOTE — TELEPHONE ENCOUNTER
Called and spoke to patient regarding lab work prior to physical appt 4/6  Patient stated she went this  morning and it was to crowded she had to leave and go to work  Advised patient if blood work is not completed by the appt date the appt will need to be rescheduled   Patient ware and understands

## 2023-04-06 ENCOUNTER — OFFICE VISIT (OUTPATIENT)
Dept: FAMILY MEDICINE CLINIC | Facility: CLINIC | Age: 48
End: 2023-04-06

## 2023-04-06 VITALS
HEART RATE: 60 BPM | OXYGEN SATURATION: 99 % | DIASTOLIC BLOOD PRESSURE: 68 MMHG | HEIGHT: 60 IN | RESPIRATION RATE: 16 BRPM | BODY MASS INDEX: 26.31 KG/M2 | TEMPERATURE: 97.1 F | SYSTOLIC BLOOD PRESSURE: 110 MMHG | WEIGHT: 134 LBS

## 2023-04-06 DIAGNOSIS — Z12.31 ENCOUNTER FOR SCREENING MAMMOGRAM FOR MALIGNANT NEOPLASM OF BREAST: ICD-10-CM

## 2023-04-06 DIAGNOSIS — Z00.00 ANNUAL PHYSICAL EXAM: Primary | ICD-10-CM

## 2023-04-06 DIAGNOSIS — Z12.11 SCREENING FOR MALIGNANT NEOPLASM OF COLON: ICD-10-CM

## 2023-04-06 NOTE — PROGRESS NOTES
FAMILY PRACTICE HEALTH MAINTENANCE OFFICE VISIT  [facility] - [facility]    NAME: Estefania Floor  AGE: 50 y o  SEX: female  : 1975     DATE: 2023    Assessment and Plan     1  Annual physical exam  Heart healthy, carbohydrate controlled diet- limit red meat, limit saturated fat, moderate salt intake, limit junk food, etc    Regular exercise  Stress management  Routine labwork and screenings as ordered  2  Encounter for screening mammogram for malignant neoplasm of breast  - Mammo screening bilateral w 3d & cad; Future    3  Screening for malignant neoplasm of colon  - Ambulatory referral for colonoscopy; Future    · Patient Counseling:   · Nutrition: Stressed importance of a well balanced diet, moderation of sodium/saturated fat, caloric balance and sufficient intake of fiber  · Exercise: Stressed the importance of regular exercise with a goal of 150 minutes per week  · Dental Health: Discussed daily flossing and brushing and regular dental visits   · Injury Prevention: Discussed Safety Belts, Safety Helmets, and Smoke Detectors    · Immunizations reviewed: Risks and Benefits discussed  · Discussed benefits of:  Mammogram , Cervical Cancer screening and Screening labs  • BMI Counseling: Body mass index is 26 17 kg/m²  Discussed with patient's BMI with her  The BMI is above normal  Nutrition recommendations include reducing portion sizes  Exercise recommendations include exercising 3-5 times per week            Chief Complaint     Chief Complaint   Patient presents with   • Physical Exam       History of Present Illness     Here for annual exam and lab review  No recent illness  Feels well  No specific concerns or issues      Well Adult Physical   Patient here for a comprehensive physical exam       Diet and Physical Activity  Diet: well balanced diet  Exercise: daily      Depression Screen  PHQ-2/9 Depression Screening    Little interest or pleasure in doing things: 0 - not at all  Feeling down, depressed, or hopeless: 0 - not at all  PHQ-2 Score: 0  PHQ-2 Interpretation: Negative depression screen     Depression Screening Follow-up Plan: Patient's depression screening was negative with a PHQ-2 score of 0    Clinically patient does not have depression  No treatment is required  General Health  Hearing: Normal:  bilateral  Vision: goes for regular eye exams and wears contacts  Dental: regular dental visits    Reproductive Health  No issues  and Follows with gynecologist      The following portions of the patient's history were reviewed and updated as appropriate: allergies, current medications, past family history, past medical history, past social history, past surgical history and problem list     Review of Systems     Review of Systems   Constitutional: Negative for chills, diaphoresis, fatigue and fever  HENT: Negative for congestion, ear pain, sinus pressure, sinus pain and sore throat  Eyes: Negative for visual disturbance  Respiratory: Negative for cough, chest tightness, shortness of breath and wheezing  Cardiovascular: Negative for chest pain, palpitations and leg swelling  Gastrointestinal: Negative for abdominal distention, abdominal pain, diarrhea and nausea  Endocrine: Negative for polydipsia  Genitourinary: Negative for dysuria, frequency and urgency  Musculoskeletal: Negative for arthralgias, back pain and myalgias  Allergic/Immunologic: Negative for immunocompromised state  Neurological: Negative for dizziness, weakness and headaches  Hematological: Negative for adenopathy  Psychiatric/Behavioral: Negative for dysphoric mood  The patient is not nervous/anxious  All other systems reviewed and are negative        Past Medical History     Past Medical History:   Diagnosis Date   • No known health problems        Past Surgical History     Past Surgical History:   Procedure Laterality Date   • CHOLECYSTECTOMY     • GALLBLADDER SURGERY     • WISDOM TOOTH EXTRACTION         Social History     Social History     Socioeconomic History   • Marital status: /Civil Union     Spouse name: None   • Number of children: None   • Years of education: None   • Highest education level: None   Occupational History   • None   Tobacco Use   • Smoking status: Never   • Smokeless tobacco: Never   Vaping Use   • Vaping Use: Never used   Substance and Sexual Activity   • Alcohol use: Yes     Comment: social   • Drug use: No   • Sexual activity: Yes     Partners: Male     Birth control/protection: Male Sterilization   Other Topics Concern   • None   Social History Narrative   • None     Social Determinants of Health     Financial Resource Strain: Not on file   Food Insecurity: Not on file   Transportation Needs: Not on file   Physical Activity: Not on file   Stress: Not on file   Social Connections: Not on file   Intimate Partner Violence: Not on file   Housing Stability: Not on file       Family History     Family History   Problem Relation Age of Onset   • No Known Problems Mother    • No Known Problems Father    • No Known Problems Sister    • No Known Problems Daughter    • No Known Problems Paternal Aunt    • No Known Problems Paternal Aunt    • Substance Abuse Neg Hx    • Mental illness Neg Hx        Current Medications     No current outpatient medications on file       Allergies     No Known Allergies    Objective     Recent Results (from the past 672 hour(s))   CBC and differential    Collection Time: 04/05/23  7:19 AM   Result Value Ref Range    WBC 4 08 (L) 4 31 - 10 16 Thousand/uL    RBC 4 46 3 81 - 5 12 Million/uL    Hemoglobin 13 0 11 5 - 15 4 g/dL    Hematocrit 40 5 34 8 - 46 1 %    MCV 91 82 - 98 fL    MCH 29 1 26 8 - 34 3 pg    MCHC 32 1 31 4 - 37 4 g/dL    RDW 13 3 11 6 - 15 1 %    MPV 11 2 8 9 - 12 7 fL    Platelets 008 375 - 808 Thousands/uL    nRBC 0 /100 WBCs    Neutrophils Relative 47 43 - 75 %    Immat GRANS % 0 0 - 2 % Lymphocytes Relative 32 14 - 44 %    Monocytes Relative 9 4 - 12 %    Eosinophils Relative 10 (H) 0 - 6 %    Basophils Relative 2 (H) 0 - 1 %    Neutrophils Absolute 1 94 1 85 - 7 62 Thousands/µL    Immature Grans Absolute 0 01 0 00 - 0 20 Thousand/uL    Lymphocytes Absolute 1 29 0 60 - 4 47 Thousands/µL    Monocytes Absolute 0 37 0 17 - 1 22 Thousand/µL    Eosinophils Absolute 0 41 0 00 - 0 61 Thousand/µL    Basophils Absolute 0 06 0 00 - 0 10 Thousands/µL   Comprehensive metabolic panel    Collection Time: 04/05/23  7:19 AM   Result Value Ref Range    Sodium 138 135 - 147 mmol/L    Potassium 3 9 3 5 - 5 3 mmol/L    Chloride 108 96 - 108 mmol/L    CO2 27 21 - 32 mmol/L    ANION GAP 3 (L) 4 - 13 mmol/L    BUN 18 5 - 25 mg/dL    Creatinine 0 83 0 60 - 1 30 mg/dL    Glucose, Fasting 81 65 - 99 mg/dL    Calcium 9 3 8 3 - 10 1 mg/dL    AST 50 (H) 5 - 45 U/L    ALT 75 12 - 78 U/L    Alkaline Phosphatase 74 46 - 116 U/L    Total Protein 6 4 6 4 - 8 4 g/dL    Albumin 3 6 3 5 - 5 0 g/dL    Total Bilirubin 0 75 0 20 - 1 00 mg/dL    eGFR 83 ml/min/1 73sq m   Lipid panel    Collection Time: 04/05/23  7:19 AM   Result Value Ref Range    Cholesterol 195 See Comment mg/dL    Triglycerides 79 See Comment mg/dL    HDL, Direct 63 >=50 mg/dL    LDL Calculated 116 (H) 0 - 100 mg/dL    Non-HDL-Chol (CHOL-HDL) 132 mg/dl   Vitamin D 25 hydroxy    Collection Time: 04/05/23  7:19 AM   Result Value Ref Range    Vit D, 25-Hydroxy 26 0 (L) 30 0 - 100 0 ng/mL   Hepatitis C antibody    Collection Time: 04/05/23  7:19 AM   Result Value Ref Range    Hepatitis C Ab Non-reactive Non-Reactive     Reviewed lab/diagnostic results with pt including both normal and abnormal findings  In depth counseling and instructions given  All questions answered during visit       /68   Pulse 60   Temp (!) 97 1 °F (36 2 °C) (Temporal)   Resp 16   Ht 5' (1 524 m)   Wt 60 8 kg (134 lb)   LMP 01/13/2020 (Approximate)   SpO2 99%   BMI 26 17 kg/m² Physical Exam  Constitutional:       General: She is not in acute distress  Appearance: Normal appearance  She is well-developed  She is not ill-appearing  HENT:      Head: Normocephalic and atraumatic  Right Ear: Tympanic membrane and ear canal normal       Left Ear: Tympanic membrane and ear canal normal       Nose: Nose normal       Mouth/Throat:      Mouth: Mucous membranes are moist       Pharynx: Oropharynx is clear  Eyes:      General: No scleral icterus  Extraocular Movements: Extraocular movements intact  Pupils: Pupils are equal, round, and reactive to light  Neck:      Thyroid: No thyromegaly  Vascular: No carotid bruit  Cardiovascular:      Rate and Rhythm: Normal rate and regular rhythm  Heart sounds: Normal heart sounds  No murmur heard  Pulmonary:      Effort: Pulmonary effort is normal  No respiratory distress  Breath sounds: Normal breath sounds  No wheezing or rales  Abdominal:      General: Bowel sounds are normal  There is no distension  Palpations: Abdomen is soft  Tenderness: There is no abdominal tenderness  Musculoskeletal:         General: Normal range of motion  Cervical back: Normal range of motion and neck supple  Right lower leg: No edema  Left lower leg: No edema  Lymphadenopathy:      Cervical: No cervical adenopathy  Skin:     General: Skin is warm and dry  Coloration: Skin is not jaundiced or pale  Neurological:      General: No focal deficit present  Mental Status: She is alert and oriented to person, place, and time  Cranial Nerves: No cranial nerve deficit  Sensory: No sensory deficit  Psychiatric:         Mood and Affect: Mood normal          Behavior: Behavior normal          Thought Content:  Thought content normal          Judgment: Judgment normal            Vision Screening    Right eye Left eye Both eyes   Without correction 20/10 20/10 20/10   With correction      Comments: Color pass          Kavin Bo

## 2023-04-24 ENCOUNTER — TELEPHONE (OUTPATIENT)
Dept: GASTROENTEROLOGY | Facility: CLINIC | Age: 48
End: 2023-04-24

## 2023-04-24 ENCOUNTER — PREP FOR PROCEDURE (OUTPATIENT)
Dept: GASTROENTEROLOGY | Facility: CLINIC | Age: 48
End: 2023-04-24

## 2023-04-24 DIAGNOSIS — Z12.11 SCREENING FOR COLON CANCER: Primary | ICD-10-CM

## 2023-04-24 NOTE — TELEPHONE ENCOUNTER
Scheduled date of colonoscopy (as of today):  7/17/23  Physician performing colonoscopy: Vanessa Aquino  Location of colonoscopy: [facility]    Clearances: Pt is fully VAX

## 2023-07-31 ENCOUNTER — TELEPHONE (OUTPATIENT)
Dept: FAMILY MEDICINE CLINIC | Facility: CLINIC | Age: 48
End: 2023-07-31

## 2023-07-31 DIAGNOSIS — Z12.11 SCREENING FOR MALIGNANT NEOPLASM OF COLON: Primary | ICD-10-CM

## 2023-08-16 LAB — COLOGUARD RESULT REPORTABLE: NEGATIVE

## 2023-10-05 ENCOUNTER — HOSPITAL ENCOUNTER (OUTPATIENT)
Dept: RADIOLOGY | Facility: HOSPITAL | Age: 48
Discharge: HOME | End: 2023-10-05
Payer: COMMERCIAL

## 2023-10-05 VITALS — BODY MASS INDEX: 26.31 KG/M2 | WEIGHT: 134 LBS | HEIGHT: 60 IN

## 2023-10-05 DIAGNOSIS — Z12.31 ENCOUNTER FOR SCREENING MAMMOGRAM FOR MALIGNANT NEOPLASM OF BREAST: ICD-10-CM

## 2023-10-05 PROCEDURE — 77063 BREAST TOMOSYNTHESIS BI: CPT

## 2023-10-05 PROCEDURE — 77067 SCR MAMMO BI INCL CAD: CPT

## 2023-10-06 ENCOUNTER — TELEPHONE (OUTPATIENT)
Age: 48
End: 2023-10-06

## 2023-12-18 ENCOUNTER — OFFICE VISIT (OUTPATIENT)
Dept: FAMILY MEDICINE CLINIC | Facility: CLINIC | Age: 48
End: 2023-12-18
Payer: COMMERCIAL

## 2023-12-18 ENCOUNTER — TELEPHONE (OUTPATIENT)
Age: 48
End: 2023-12-18

## 2023-12-18 VITALS
WEIGHT: 142 LBS | DIASTOLIC BLOOD PRESSURE: 80 MMHG | BODY MASS INDEX: 27.88 KG/M2 | HEIGHT: 60 IN | OXYGEN SATURATION: 98 % | TEMPERATURE: 97.9 F | SYSTOLIC BLOOD PRESSURE: 120 MMHG | HEART RATE: 70 BPM | RESPIRATION RATE: 16 BRPM

## 2023-12-18 DIAGNOSIS — J01.00 ACUTE NON-RECURRENT MAXILLARY SINUSITIS: Primary | ICD-10-CM

## 2023-12-18 PROCEDURE — 99214 OFFICE O/P EST MOD 30 MIN: CPT | Performed by: NURSE PRACTITIONER

## 2023-12-18 RX ORDER — AMOXICILLIN AND CLAVULANATE POTASSIUM 875; 125 MG/1; MG/1
1 TABLET, FILM COATED ORAL EVERY 12 HOURS SCHEDULED
Qty: 20 TABLET | Refills: 0 | Status: SHIPPED | OUTPATIENT
Start: 2023-12-18 | End: 2023-12-28

## 2023-12-18 RX ORDER — FLUTICASONE PROPIONATE 50 MCG
1 SPRAY, SUSPENSION (ML) NASAL 2 TIMES DAILY
Qty: 16 G | Refills: 0 | Status: SHIPPED | OUTPATIENT
Start: 2023-12-18

## 2023-12-18 NOTE — TELEPHONE ENCOUNTER
Patient has green mucus, stuffy nose, very congested, cough started 2 weeks ago. Taking Advil Cold and Sinus not working.     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 Therefore, patient requested a virtual appt today. However, patient has not been seen since April 2023.      Please contact patient and let her know if this is ok.       Thank you for your help.

## 2023-12-18 NOTE — PROGRESS NOTES
"Name: Nica Weinstein      : 1975      MRN: [id number]  Encounter Provider: ZENOBIA Reed  Encounter Date: 2023   Encounter department: [facility]    Assessment & Plan   1. Acute non-recurrent maxillary sinusitis  Fluids. Rest. Nasal saline rinses  Symptom management for supportive care such as decongestants, tylenol/motrin as needed for fever or discomfort.  Use a cool mist humidifier at bedtime.    Flonase as directed. Augmentin 875mg twice daily for 10 days. Finish antibiotics as prescribed. Recommend taking antibiotics with food.   Take probiotic while taking antibiotics to minimize gastrointestinal side effects.   Warm compresses to facilitate nasal drainage.   Call or return to office if symptoms worsen or if new symptoms develop.   - amoxicillin-clavulanate (AUGMENTIN) 875-125 mg per tablet; Take 1 tablet by mouth every 12 (twelve) hours for 10 days  Dispense: 20 tablet; Refill: 0  - fluticasone (FLONASE) 50 mcg/act nasal spray; 1 spray into each nostril 2 (two) times a day  Dispense: 16 g; Refill: 0      Subjective      Here for cold symptoms  Cough, congestion. No fevers  Very tired. Now feels like everything is \"stuck\" in sinus area. No facial pain.   Symptoms for at least 2 weeks  Has been taking advil cold and sinus. Yellow/green nasal discharge  Eyes draining for 3 days. Yellow, thick discharge. \"Glued shut at times\"  Tried nasal saline and \"won't go through\"      Cough  Associated symptoms include myalgias, postnasal drip and rhinorrhea. Pertinent negatives include no eye redness, fever, headaches, rash, sore throat or shortness of breath.     Review of Systems   Constitutional:  Positive for fatigue. Negative for fever.   HENT:  Positive for congestion, postnasal drip, rhinorrhea, sinus pressure and sinus pain. Negative for sore throat.    Eyes:  Positive for discharge. Negative for redness and itching.   Respiratory:  Positive for cough. Negative for " shortness of breath.    Gastrointestinal:  Negative for diarrhea, nausea and vomiting.   Musculoskeletal:  Positive for myalgias.   Skin:  Negative for rash.   Allergic/Immunologic: Negative for immunocompromised state.   Neurological:  Negative for dizziness and headaches.   Hematological:  Negative for adenopathy.       Current Outpatient Medications on File Prior to Visit   Medication Sig    polyethylene glycol (GOLYTELY) 4000 mL solution Take as directed by office (Patient not taking: Reported on 12/18/2023)       Objective     /80 (BP Location: Right arm, Patient Position: Sitting, Cuff Size: Standard)   Pulse 70   Temp 97.9 °F (36.6 °C)   Resp 16   Ht 5' (1.524 m)   Wt 64.4 kg (142 lb)   LMP 12/22/2019 (Approximate)   SpO2 98%   BMI 27.73 kg/m²     Physical Exam  Vitals reviewed.   Constitutional:       General: She is not in acute distress.     Appearance: She is ill-appearing.   HENT:      Right Ear: Tympanic membrane and ear canal normal.      Left Ear: Tympanic membrane and ear canal normal.      Nose: Congestion present.      Comments: Turbinates significantly erythematous and edematous  Tenderness over maxillary sinus area.   Mild edema noted over maxillary sinus area.   Eyes:      General:         Right eye: No discharge.         Left eye: No discharge.      Comments: Sclera and conjunctiva not injected   Cardiovascular:      Rate and Rhythm: Normal rate and regular rhythm.   Pulmonary:      Effort: Pulmonary effort is normal. No respiratory distress.      Breath sounds: Normal breath sounds. No wheezing or rhonchi.   Musculoskeletal:      Cervical back: Normal range of motion.   Lymphadenopathy:      Cervical: No cervical adenopathy.   Skin:     General: Skin is warm and dry.      Coloration: Skin is not jaundiced or pale.   Neurological:      General: No focal deficit present.      Mental Status: She is alert and oriented to person, place, and time.      Cranial Nerves: No cranial nerve  deficit.      Sensory: No sensory deficit.   Psychiatric:         Mood and Affect: Mood normal.         Behavior: Behavior normal.         Thought Content: Thought content normal.         Judgment: Judgment normal.       ZENOBIA Reed

## 2024-01-22 ENCOUNTER — OFFICE VISIT (OUTPATIENT)
Dept: FAMILY MEDICINE CLINIC | Facility: CLINIC | Age: 49
End: 2024-01-22
Payer: COMMERCIAL

## 2024-01-22 VITALS
OXYGEN SATURATION: 96 % | SYSTOLIC BLOOD PRESSURE: 124 MMHG | DIASTOLIC BLOOD PRESSURE: 80 MMHG | BODY MASS INDEX: 27.88 KG/M2 | HEIGHT: 60 IN | WEIGHT: 142 LBS | TEMPERATURE: 98.8 F | HEART RATE: 64 BPM | RESPIRATION RATE: 16 BRPM

## 2024-01-22 DIAGNOSIS — F90.1 ADHD (ATTENTION DEFICIT HYPERACTIVITY DISORDER), PREDOMINANTLY HYPERACTIVE IMPULSIVE TYPE: Primary | ICD-10-CM

## 2024-01-22 PROCEDURE — 99213 OFFICE O/P EST LOW 20 MIN: CPT | Performed by: NURSE PRACTITIONER

## 2024-01-22 RX ORDER — DEXTROAMPHETAMINE SACCHARATE, AMPHETAMINE ASPARTATE, DEXTROAMPHETAMINE SULFATE AND AMPHETAMINE SULFATE 2.5; 2.5; 2.5; 2.5 MG/1; MG/1; MG/1; MG/1
10 TABLET ORAL DAILY
Qty: 30 TABLET | Refills: 0 | Status: SHIPPED | OUTPATIENT
Start: 2024-01-22

## 2024-01-22 NOTE — PROGRESS NOTES
"Name: Nica Weinstein      : 1975      MRN: [id number]  Encounter Provider: ZENOBIA Reed  Encounter Date: 2024   Encounter department: [facility]    Assessment & Plan   1. ADHD (attention deficit hyperactivity disorder), predominantly hyperactive impulsive type  Reviewed .  Review Appropriate. Patient is not receiving medications from other  Providers.  No physical or psychological signs of dependence.  Patient compliant with our agreement.  discussed the risks of developing physical/psychological dependence of the controlled substance.  Discussed risks, dangers of addiction overdose.  Discussed dangers of concurrent use of current medications .  Side effects include but are not limited to nausea, vomiting, GI intolerance, sedation, constipation, mental clouding, opioid-induced hyperalgesia, endocrine dysfunction, addiction, dependence, and tolerance. The patient was asked to take his medications only as prescribed and directed, never in excess, and never for any other reason other than for ADD/ADHD.   Patient is advised that he/she must comply with prescribed treatment plans related to current  disorder including counseling, follow-up, use of adjunctive non pharmacologic approaches.  If he/she does not comply with prescribed treatment plans or violate controlled substance agreement he may be subject to dismissal from this practice.  He/ she verbalizes understanding and agreement of the above.    - amphetamine-dextroamphetamine (ADDERALL, 10MG,) 10 mg tablet; Take 1 tablet (10 mg total) by mouth daily Max Daily Amount: 10 mg  Dispense: 30 tablet; Refill: 0      Subjective      Here to discuss possible ADHD and treatment  Works as   Never diagnosed with same in past.   Feels always had issues but getting worse  Always feels like she is \"ping ponging\" back and forth. Cannot stay on task, can't focus.   Thinks  needs medication.   Family members have " noticed her inability to stay focused.   Getting very irritable.           Review of Systems   Constitutional:  Negative for fatigue and unexpected weight change.   Allergic/Immunologic: Negative for immunocompromised state.   Psychiatric/Behavioral:  Positive for decreased concentration. Negative for dysphoric mood. The patient is not nervous/anxious.        Current Outpatient Medications on File Prior to Visit   Medication Sig    fluticasone (FLONASE) 50 mcg/act nasal spray 1 spray into each nostril 2 (two) times a day       Objective   [place] assessment reviewed.   Many answers OFTEN or VERY OFTEN: difficulty keeping attention, does not always seem to listen, difficulty organizing tasks, easily distracted, forgetful, talks too much, blurts out answers, interrupts, loses temper, easily annoyed  Meeting criteria for inattentive and hyperactive/impulsive    /80 (BP Location: Right arm, Patient Position: Sitting, Cuff Size: Standard)   Pulse 64   Temp 98.8 °F (37.1 °C)   Resp 16   Ht 5' (1.524 m)   Wt 64.4 kg (142 lb)   LMP 12/22/2019 (Approximate)   SpO2 96%   BMI 27.73 kg/m²     Physical Exam  Vitals reviewed.   Constitutional:       General: She is not in acute distress.  Cardiovascular:      Rate and Rhythm: Normal rate.   Pulmonary:      Effort: Pulmonary effort is normal.   Skin:     General: Skin is warm and dry.   Neurological:      Mental Status: She is alert.   Psychiatric:         Mood and Affect: Mood normal.         Behavior: Behavior normal.         Thought Content: Thought content normal.         Judgment: Judgment normal.      Comments: Well groomed. Dressed appropriately for the weather.   Calm. Pleasant . Cooperative.   Good eye contact.   Converses freely and appropriately.            ZENOBIA Reed

## 2024-02-19 ENCOUNTER — OFFICE VISIT (OUTPATIENT)
Dept: FAMILY MEDICINE CLINIC | Facility: CLINIC | Age: 49
End: 2024-02-19
Payer: COMMERCIAL

## 2024-02-19 VITALS
RESPIRATION RATE: 16 BRPM | WEIGHT: 140 LBS | OXYGEN SATURATION: 100 % | TEMPERATURE: 99 F | HEART RATE: 69 BPM | HEIGHT: 60 IN | BODY MASS INDEX: 27.48 KG/M2 | SYSTOLIC BLOOD PRESSURE: 110 MMHG | DIASTOLIC BLOOD PRESSURE: 70 MMHG

## 2024-02-19 DIAGNOSIS — F90.1 ADHD (ATTENTION DEFICIT HYPERACTIVITY DISORDER), PREDOMINANTLY HYPERACTIVE IMPULSIVE TYPE: ICD-10-CM

## 2024-02-19 PROCEDURE — 99213 OFFICE O/P EST LOW 20 MIN: CPT | Performed by: NURSE PRACTITIONER

## 2024-02-19 RX ORDER — DEXTROAMPHETAMINE SACCHARATE, AMPHETAMINE ASPARTATE, DEXTROAMPHETAMINE SULFATE AND AMPHETAMINE SULFATE 2.5; 2.5; 2.5; 2.5 MG/1; MG/1; MG/1; MG/1
10 TABLET ORAL DAILY
Qty: 30 TABLET | Refills: 0 | Status: SHIPPED | OUTPATIENT
Start: 2024-02-19

## 2024-02-19 NOTE — PROGRESS NOTES
Name: Nica Weinstein      : 1975      MRN: [id number]  Encounter Provider: ZENOBIA Reed  Encounter Date: 2024   Encounter department: [facility]    Assessment & Plan   1. ADHD (attention deficit hyperactivity disorder), predominantly hyperactive impulsive type  Patient clinically stable today.  Given that the patient reports overall improved level functioning without significant side effects, I felt a reasonable to continue the patient on current agent and dosing schedule as needed for ADDH  Continue current treatment plan.  Reviewed .  Review Appropriate. Patient is not receiving medications from other  Providers.  No physical or psychological signs of dependence.  Patient compliant with our agreement.  Again discussed the risks of developing physical/psychological dependence of the controlled substance.  Discussed risks, dangers of addiction overdose.  Discussed dangers of concurrent use of current medications with alcohol, benzos and other depressants.  Side effects include but are not limited to nausea, vomiting, GI intolerance, sedation, constipation, mental clouding, opioid-induced hyperalgesia, endocrine dysfunction, addiction, dependence, and tolerance. The patient was asked to take his medications only as prescribed and directed, never in excess, and never for any other reason other than for ADD/ADHD. The patient was also asked to keep his medications out of the reach of others and away from children, preferably in a locked drawer.  Patient is advised that he/she must comply with prescribed treatment plans related to current  disorder including counseling, follow-up, use of adjunctive non pharmacologic approaches.  If he/she does not comply with prescribed treatment plans or violate controlled substance agreement he may be subject to dismissal from this practice.  He/ she verbalizes understanding and agreement of the above.    - amphetamine-dextroamphetamine  (ADDERALL, 10MG,) 10 mg tablet; Take 1 tablet (10 mg total) by mouth daily Max Daily Amount: 10 mg  Dispense: 30 tablet; Refill: 0         Subjective      Here for med check  Started on adderall on 1/22 for ADHD. Taking 10mg daily.   No side effects since starting med.   Works as teacher  Does report noticed improvement in focus, concentration, and less impulsive.           Review of Systems   Constitutional:  Negative for unexpected weight change.   Cardiovascular:  Negative for palpitations.   Gastrointestinal:  Negative for nausea.   Allergic/Immunologic: Negative for immunocompromised state.   Psychiatric/Behavioral:  Positive for decreased concentration.        Current Outpatient Medications on File Prior to Visit   Medication Sig    amphetamine-dextroamphetamine (ADDERALL, 10MG,) 10 mg tablet Take 1 tablet (10 mg total) by mouth daily Max Daily Amount: 10 mg    fluticasone (FLONASE) 50 mcg/act nasal spray 1 spray into each nostril 2 (two) times a day       Objective     /70 (BP Location: Left arm, Patient Position: Sitting, Cuff Size: Standard)   Pulse 69   Temp 99 °F (37.2 °C)   Resp 16   Ht 5' (1.524 m)   Wt 63.5 kg (140 lb)   LMP 12/22/2019 (Approximate)   SpO2 100%   BMI 27.34 kg/m²     Physical Exam  Vitals reviewed.   Constitutional:       General: She is not in acute distress.  Cardiovascular:      Rate and Rhythm: Normal rate.   Pulmonary:      Effort: Pulmonary effort is normal.   Neurological:      Mental Status: She is alert and oriented to person, place, and time.   Psychiatric:         Mood and Affect: Mood normal.         Behavior: Behavior normal.         Thought Content: Thought content normal.         Judgment: Judgment normal.       ZENOBIA Reed

## 2024-02-26 ENCOUNTER — TELEPHONE (OUTPATIENT)
Dept: FAMILY MEDICINE CLINIC | Facility: CLINIC | Age: 49
End: 2024-02-26

## 2024-02-26 DIAGNOSIS — E55.9 VITAMIN D DEFICIENCY: Primary | ICD-10-CM

## 2024-02-26 DIAGNOSIS — E78.2 MIXED HYPERLIPIDEMIA: ICD-10-CM

## 2024-02-26 DIAGNOSIS — Z00.00 ANNUAL PHYSICAL EXAM: ICD-10-CM

## 2024-02-26 NOTE — TELEPHONE ENCOUNTER
----- Message from ZENOBIA Reed sent at 2/26/2024 12:20 PM EST -----  Regarding: labs  Pt has upcoming appt for a physical on 3/9 BUT this appt needs to be changed. She is not due for physical until after 4/7. Will need to have labs done prior to appt. Orders in chart.   Please call pt to advise.

## 2024-02-26 NOTE — TELEPHONE ENCOUNTER
Spoke to patient...  reminded her that she needs to get her labs done prior to her upcoming physical in April.

## 2024-03-22 ENCOUNTER — TELEMEDICINE (OUTPATIENT)
Dept: FAMILY MEDICINE CLINIC | Facility: CLINIC | Age: 49
End: 2024-03-22
Payer: COMMERCIAL

## 2024-03-22 ENCOUNTER — TELEPHONE (OUTPATIENT)
Age: 49
End: 2024-03-22

## 2024-03-22 DIAGNOSIS — F90.1 ADHD (ATTENTION DEFICIT HYPERACTIVITY DISORDER), PREDOMINANTLY HYPERACTIVE IMPULSIVE TYPE: ICD-10-CM

## 2024-03-22 PROCEDURE — 99213 OFFICE O/P EST LOW 20 MIN: CPT | Performed by: NURSE PRACTITIONER

## 2024-03-22 RX ORDER — DEXTROAMPHETAMINE SACCHARATE, AMPHETAMINE ASPARTATE, DEXTROAMPHETAMINE SULFATE AND AMPHETAMINE SULFATE 5; 5; 5; 5 MG/1; MG/1; MG/1; MG/1
20 TABLET ORAL DAILY
Qty: 30 TABLET | Refills: 0 | Status: SHIPPED | OUTPATIENT
Start: 2024-03-22

## 2024-03-22 NOTE — PROGRESS NOTES
Virtual Regular Visit    Verification of patient location:    Patient is located at Home in the following state in which I hold an active license [place]      Assessment/Plan:  1. ADHD (attention deficit hyperactivity disorder), predominantly hyperactive impulsive type  Patient clinically stable today.  Given that the patient reports overall improved level functioning without significant side effects, I felt a reasonable to continue the patient on current agent and dosing schedule as needed for ADDH  Continue current treatment plan.  Reviewed .  Review Appropriate. Patient is not receiving medications from other  Providers.  No physical or psychological signs of dependence.  Patient compliant with our agreement.  Again discussed the risks of developing physical/psychological dependence of the controlled substance.  Discussed risks, dangers of addiction overdose.  Discussed dangers of concurrent use of current medications with alcohol, benzos and other depressants.  Side effects include but are not limited to nausea, vomiting, GI intolerance, sedation, constipation, mental clouding, opioid-induced hyperalgesia, endocrine dysfunction, addiction, dependence, and tolerance. The patient was asked to take his medications only as prescribed and directed, never in excess, and never for any other reason other than for ADD/ADHD. The patient was also asked to keep his medications out of the reach of others and away from children, preferably in a locked drawer.  Patient is advised that he/she must comply with prescribed treatment plans related to current  disorder including counseling, follow-up, use of adjunctive non pharmacologic approaches.  If he/she does not comply with prescribed treatment plans or violate controlled substance agreement he may be subject to dismissal from this practice.  He/ she verbalizes understanding and agreement of the above.    - amphetamine-dextroamphetamine (ADDERALL, 20MG,) 20 mg tablet; Take 1  "tablet (20 mg total) by mouth daily Max Daily Amount: 20 mg  Dispense: 30 tablet; Refill: 0    Problem List Items Addressed This Visit       ADHD (attention deficit hyperactivity disorder), predominantly hyperactive impulsive type            Reason for visit is   Chief Complaint   Patient presents with    medication check      [facility]     Virtual Regular Visit          Encounter provider ZENOBIA Reed    Provider located at [address] [address]      Recent Visits  No visits were found meeting these conditions.  Showing recent visits within past 7 days and meeting all other requirements  Today's Visits  Date Type Provider Dept   03/22/24 Telemedicine ZENOBIA Reed [facility]   Showing today's visits and meeting all other requirements  Future Appointments  No visits were found meeting these conditions.  Showing future appointments within next 150 days and meeting all other requirements       The patient was identified by name and date of birth. Nica Weinstein was informed that this is a telemedicine visit and that the visit is being conducted through the [employer] platform. She agrees to proceed..  My office door was closed. No one else was in the room.  She acknowledged consent and understanding of privacy and security of the video platform. The patient has agreed to participate and understands they can discontinue the visit at any time.    Patient is aware this is a billable service.     Subjective  Nica Weinstein is a 48 y.o. female  med check and adhd follow up .      Follow up adhd.   On adderall 10mg daily  Works as teacher  Has definitely noticed improvement in focus, concentration, task completion, etc since starting medication  Has been playing dose as we discussed at last appt. She tried one tablet (10mg) and it \"worked\" but then tried 2 tablets (20mg) and found that to work even better. No side effects. "   Would like to continue 20mg dose if possible.              Past Medical History:   Diagnosis Date    No known health problems        Past Surgical History:   Procedure Laterality Date    CHOLECYSTECTOMY      GALLBLADDER SURGERY      WISDOM TOOTH EXTRACTION         Current Outpatient Medications   Medication Sig Dispense Refill    amphetamine-dextroamphetamine (ADDERALL, 10MG,) 10 mg tablet Take 1 tablet (10 mg total) by mouth daily Max Daily Amount: 10 mg 30 tablet 0    fluticasone (FLONASE) 50 mcg/act nasal spray 1 spray into each nostril 2 (two) times a day (Patient taking differently: 1 spray into each nostril if needed) 16 g 0     No current facility-administered medications for this visit.        No Known Allergies    Review of Systems   Constitutional:  Negative for unexpected weight change.   Cardiovascular:  Negative for palpitations.   Gastrointestinal:  Negative for nausea.   Allergic/Immunologic: Negative for immunocompromised state.   Psychiatric/Behavioral:  Positive for decreased concentration. Negative for sleep disturbance.        Video Exam    There were no vitals filed for this visit.    Physical Exam  Constitutional:       General: She is not in acute distress.     Appearance: She is not ill-appearing.   Pulmonary:      Effort: Pulmonary effort is normal.   Skin:     Coloration: Skin is not jaundiced or pale.   Neurological:      Mental Status: She is alert.   Psychiatric:         Mood and Affect: Mood normal.         Behavior: Behavior normal.         Thought Content: Thought content normal.         Judgment: Judgment normal.      Comments: Well groomed. Dressed appropriately for the weather.   Calm. Pleasant . Cooperative.   Good eye contact.   Converses freely and appropriately.               Visit Time  Total Visit Duration: 10 minutes spent reviewing record, documenting, counseling pt on dx/impression, meds, tx plan.   Patient was counseled regarding instructions for management which  included: impression/diagnosis, risk/benefits of treatment options, importance of compliance with treatment, risk factor reduction, and prognosis.   I have reviewed the instructions with the patient answering all questions and patient verbalized understanding.

## 2024-03-22 NOTE — TELEPHONE ENCOUNTER
Nica called in stating that she was supposed to have a virtual with Aparna for a med check. Patient did not have an appointment scheduled. Looked at last office visit note and Aparna had stated follow-up in 4 weeks on 3/18 for virtual at 5:45 for a med check. Patient was scheduled for a virtual today at 11:45am. NFA needed at this time.

## 2024-04-19 ENCOUNTER — TELEMEDICINE (OUTPATIENT)
Dept: FAMILY MEDICINE CLINIC | Facility: CLINIC | Age: 49
End: 2024-04-19
Payer: COMMERCIAL

## 2024-04-19 VITALS — BODY MASS INDEX: 25.52 KG/M2 | HEIGHT: 60 IN | WEIGHT: 130 LBS

## 2024-04-19 DIAGNOSIS — F90.1 ADHD (ATTENTION DEFICIT HYPERACTIVITY DISORDER), PREDOMINANTLY HYPERACTIVE IMPULSIVE TYPE: Primary | ICD-10-CM

## 2024-04-19 DIAGNOSIS — J30.2 SEASONAL ALLERGIES: ICD-10-CM

## 2024-04-19 PROCEDURE — 99213 OFFICE O/P EST LOW 20 MIN: CPT | Performed by: NURSE PRACTITIONER

## 2024-04-19 RX ORDER — DEXTROAMPHETAMINE SACCHARATE, AMPHETAMINE ASPARTATE, DEXTROAMPHETAMINE SULFATE AND AMPHETAMINE SULFATE 5; 5; 5; 5 MG/1; MG/1; MG/1; MG/1
20 TABLET ORAL DAILY
Qty: 30 TABLET | Refills: 0 | Status: SHIPPED | OUTPATIENT
Start: 2024-04-19

## 2024-04-19 RX ORDER — FLUTICASONE PROPIONATE 50 MCG
1 SPRAY, SUSPENSION (ML) NASAL 2 TIMES DAILY
Qty: 16 G | Refills: 0 | Status: SHIPPED | OUTPATIENT
Start: 2024-04-19

## 2024-04-19 NOTE — PROGRESS NOTES
Virtual Regular Visit    Verification of patient location:    Patient is located at Home in the following state in which I hold an active license [place]      Assessment/Plan:  1. ADHD (attention deficit hyperactivity disorder), predominantly hyperactive impulsive type  Patient clinically stable today.  Given that the patient reports overall improved level functioning without significant side effects, I felt a reasonable to continue the patient on current agent and dosing schedule as needed for ADDH  Continue current treatment plan.  Reviewed .  Review Appropriate. Patient is not receiving medications from other  Providers.  No physical or psychological signs of dependence.  Patient compliant with our agreement.  Again discussed the risks of developing physical/psychological dependence of the controlled substance.  Discussed risks, dangers of addiction overdose.  Discussed dangers of concurrent use of current medications with alcohol, benzos and other depressants.  Side effects include but are not limited to nausea, vomiting, GI intolerance, sedation, constipation, mental clouding, opioid-induced hyperalgesia, endocrine dysfunction, addiction, dependence, and tolerance. The patient was asked to take his medications only as prescribed and directed, never in excess, and never for any other reason other than for ADD/ADHD. The patient was also asked to keep his medications out of the reach of others and away from children, preferably in a locked drawer.  Patient is advised that he/she must comply with prescribed treatment plans related to current  disorder including counseling, follow-up, use of adjunctive non pharmacologic approaches.  If he/she does not comply with prescribed treatment plans or violate controlled substance agreement he may be subject to dismissal from this practice.  He/ she verbalizes understanding and agreement of the above.  - amphetamine-dextroamphetamine (ADDERALL, 20MG,) 20 mg tablet; Take 1  tablet (20 mg total) by mouth daily Max Daily Amount: 20 mg  Dispense: 30 tablet; Refill: 0    2. Seasonal allergies  - fluticasone (FLONASE) 50 mcg/act nasal spray; 1 spray into each nostril 2 (two) times a day  Dispense: 16 g; Refill: 0    Problem List Items Addressed This Visit       ADHD (attention deficit hyperactivity disorder), predominantly hyperactive impulsive type - Primary    Seasonal allergies            Reason for visit is   Chief Complaint   Patient presents with    Follow-up     Medication     Virtual Regular Visit          Encounter provider ZENOBIA Reed    Provider located at [address] [address]      Recent Visits  No visits were found meeting these conditions.  Showing recent visits within past 7 days and meeting all other requirements  Today's Visits  Date Type Provider Dept   04/19/24 Telemedicine ZENOBIA Reed [facility]   Showing today's visits and meeting all other requirements  Future Appointments  No visits were found meeting these conditions.  Showing future appointments within next 150 days and meeting all other requirements       The patient was identified by name and date of birth. Nica Weinstein was informed that this is a telemedicine visit and that the visit is being conducted through the [employer] platform. She agrees to proceed..  My office door was closed. No one else was in the room.  She acknowledged consent and understanding of privacy and security of the video platform. The patient has agreed to participate and understands they can discontinue the visit at any time.    Patient is aware this is a billable service.     Subjective  Nica Weinstein is a 49 y.o. female , med check/follow up      Follow up on ADHD/med check  On adderall 20mg daily  Started a few months ago.   Works as .   Reports significant improvement in focus and concentration when  taking medication.   No side effects.   Taking as prescribed. Usually takes around 8:30 and thinks it might wear off around 3 or 4. Not affecting sleep.   Does not usually take on weekends.   Needs refill on flonase.   Seasonal allergies, takes as needed.          Past Medical History:   Diagnosis Date    No known health problems        Past Surgical History:   Procedure Laterality Date    CHOLECYSTECTOMY      GALLBLADDER SURGERY      WISDOM TOOTH EXTRACTION         Current Outpatient Medications   Medication Sig Dispense Refill    amphetamine-dextroamphetamine (ADDERALL, 20MG,) 20 mg tablet Take 1 tablet (20 mg total) by mouth daily Max Daily Amount: 20 mg 30 tablet 0    fluticasone (FLONASE) 50 mcg/act nasal spray 1 spray into each nostril 2 (two) times a day (Patient taking differently: 1 spray into each nostril if needed) 16 g 0     No current facility-administered medications for this visit.        No Known Allergies    Review of Systems   Respiratory:  Negative for cough and shortness of breath.    Allergic/Immunologic: Positive for environmental allergies.   Psychiatric/Behavioral:  Positive for decreased concentration. Negative for dysphoric mood. The patient is not nervous/anxious.        Video Exam    Vitals:    04/19/24 1035   Weight: 59 kg (130 lb)   Height: 5' (1.524 m)       Physical Exam  Constitutional:       General: She is not in acute distress.     Appearance: She is not ill-appearing.   Pulmonary:      Effort: Pulmonary effort is normal.   Skin:     Coloration: Skin is not jaundiced or pale.   Neurological:      Mental Status: She is alert and oriented to person, place, and time.   Psychiatric:         Mood and Affect: Mood normal.         Behavior: Behavior normal.         Thought Content: Thought content normal.         Judgment: Judgment normal.          Visit Time  Total Visit Duration: 10 minutes spent counseling pt on meds/tx plan, obtaining updated history, documenting in medical record.    Patient was counseled regarding instructions for management which included: impression/diagnosis, risk/benefits of treatment options, importance of compliance with treatment, risk factor reduction, and prognosis.   I have reviewed the instructions with the patient answering all questions and patient verbalized understanding.

## 2024-04-23 ENCOUNTER — APPOINTMENT (OUTPATIENT)
Dept: LAB | Facility: CLINIC | Age: 49
End: 2024-04-23
Payer: COMMERCIAL

## 2024-04-23 DIAGNOSIS — E78.2 MIXED HYPERLIPIDEMIA: ICD-10-CM

## 2024-04-23 DIAGNOSIS — E55.9 VITAMIN D DEFICIENCY: Primary | ICD-10-CM

## 2024-04-23 DIAGNOSIS — Z00.00 ANNUAL PHYSICAL EXAM: ICD-10-CM

## 2024-04-23 LAB
25(OH)D3 SERPL-MCNC: 37.7 NG/ML (ref 30–100)
BASOPHILS # BLD AUTO: 0.05 THOUSANDS/ÂΜL (ref 0–0.1)
BASOPHILS NFR BLD AUTO: 1 % (ref 0–1)
EOSINOPHIL # BLD AUTO: 0.32 THOUSAND/ÂΜL (ref 0–0.61)
EOSINOPHIL NFR BLD AUTO: 7 % (ref 0–6)
ERYTHROCYTE [DISTWIDTH] IN BLOOD BY AUTOMATED COUNT: 13.1 % (ref 11.6–15.1)
HCT VFR BLD AUTO: 43.8 % (ref 34.8–46.1)
HGB BLD-MCNC: 14.2 G/DL (ref 11.5–15.4)
IMM GRANULOCYTES # BLD AUTO: 0.01 THOUSAND/UL (ref 0–0.2)
IMM GRANULOCYTES NFR BLD AUTO: 0 % (ref 0–2)
LYMPHOCYTES # BLD AUTO: 1.53 THOUSANDS/ÂΜL (ref 0.6–4.47)
LYMPHOCYTES NFR BLD AUTO: 34 % (ref 14–44)
MCH RBC QN AUTO: 30.1 PG (ref 26.8–34.3)
MCHC RBC AUTO-ENTMCNC: 32.4 G/DL (ref 31.4–37.4)
MCV RBC AUTO: 93 FL (ref 82–98)
MONOCYTES # BLD AUTO: 0.41 THOUSAND/ÂΜL (ref 0.17–1.22)
MONOCYTES NFR BLD AUTO: 9 % (ref 4–12)
NEUTROPHILS # BLD AUTO: 2.22 THOUSANDS/ÂΜL (ref 1.85–7.62)
NEUTS SEG NFR BLD AUTO: 49 % (ref 43–75)
NRBC BLD AUTO-RTO: 0 /100 WBCS
PLATELET # BLD AUTO: 247 THOUSANDS/UL (ref 149–390)
PMV BLD AUTO: 11.4 FL (ref 8.9–12.7)
RBC # BLD AUTO: 4.71 MILLION/UL (ref 3.81–5.12)
WBC # BLD AUTO: 4.54 THOUSAND/UL (ref 4.31–10.16)

## 2024-04-23 PROCEDURE — 85025 COMPLETE CBC W/AUTO DIFF WBC: CPT

## 2024-04-23 PROCEDURE — 80053 COMPREHEN METABOLIC PANEL: CPT

## 2024-04-23 PROCEDURE — 80061 LIPID PANEL: CPT

## 2024-04-23 PROCEDURE — 36415 COLL VENOUS BLD VENIPUNCTURE: CPT

## 2024-04-23 PROCEDURE — 82306 VITAMIN D 25 HYDROXY: CPT

## 2024-04-24 LAB
ALBUMIN SERPL BCP-MCNC: 4.4 G/DL (ref 3.5–5)
ALP SERPL-CCNC: 69 U/L (ref 34–104)
ALT SERPL W P-5'-P-CCNC: 20 U/L (ref 7–52)
ANION GAP SERPL CALCULATED.3IONS-SCNC: 7 MMOL/L (ref 4–13)
AST SERPL W P-5'-P-CCNC: 31 U/L (ref 13–39)
BILIRUB SERPL-MCNC: 0.99 MG/DL (ref 0.2–1)
BUN SERPL-MCNC: 16 MG/DL (ref 5–25)
CALCIUM SERPL-MCNC: 9.6 MG/DL (ref 8.4–10.2)
CHLORIDE SERPL-SCNC: 104 MMOL/L (ref 96–108)
CHOLEST SERPL-MCNC: 240 MG/DL
CO2 SERPL-SCNC: 30 MMOL/L (ref 21–32)
CREAT SERPL-MCNC: 0.84 MG/DL (ref 0.6–1.3)
GFR SERPL CREATININE-BSD FRML MDRD: 81 ML/MIN/1.73SQ M
GLUCOSE P FAST SERPL-MCNC: 88 MG/DL (ref 65–99)
HDLC SERPL-MCNC: 70 MG/DL
LDLC SERPL CALC-MCNC: 147 MG/DL (ref 0–100)
NONHDLC SERPL-MCNC: 170 MG/DL
POTASSIUM SERPL-SCNC: 4.3 MMOL/L (ref 3.5–5.3)
PROT SERPL-MCNC: 6.5 G/DL (ref 6.4–8.4)
SODIUM SERPL-SCNC: 141 MMOL/L (ref 135–147)
TRIGL SERPL-MCNC: 115 MG/DL

## 2024-05-03 ENCOUNTER — OFFICE VISIT (OUTPATIENT)
Dept: FAMILY MEDICINE CLINIC | Facility: CLINIC | Age: 49
End: 2024-05-03
Payer: COMMERCIAL

## 2024-05-03 VITALS
TEMPERATURE: 98.4 F | HEIGHT: 61 IN | WEIGHT: 133 LBS | SYSTOLIC BLOOD PRESSURE: 114 MMHG | BODY MASS INDEX: 25.11 KG/M2 | HEART RATE: 69 BPM | OXYGEN SATURATION: 99 % | DIASTOLIC BLOOD PRESSURE: 70 MMHG | RESPIRATION RATE: 16 BRPM

## 2024-05-03 DIAGNOSIS — Z00.00 ANNUAL PHYSICAL EXAM: Primary | ICD-10-CM

## 2024-05-03 PROCEDURE — 3725F SCREEN DEPRESSION PERFORMED: CPT | Performed by: NURSE PRACTITIONER

## 2024-05-03 PROCEDURE — 99396 PREV VISIT EST AGE 40-64: CPT | Performed by: NURSE PRACTITIONER

## 2024-05-03 NOTE — PROGRESS NOTES
[facility] HEALTH MAINTENANCE OFFICE VISIT  [facility] - [facility]    NAME: Nica Weinstein  AGE: 49 y.o. SEX: female  : 1975     DATE: 5/3/2024    Assessment and Plan   1. Annual physical exam  Heart healthy, carbohydrate controlled diet- limit red meat, limit saturated fat, moderate salt intake, limit junk food, etc.   Regular exercise  Stress management  Routine labwork and screenings as ordered.   Recommend over the counter fish oil/omega 3, 1000mg daily  Recommend calcium 1000-1200mg per day, in divided doses (500-600mg twice daily)    Patient Counseling:   Nutrition: Stressed importance of a well balanced diet, moderation of sodium/saturated fat, caloric balance and sufficient intake of fiber  Exercise: Stressed the importance of regular exercise with a goal of 150 minutes per week  Dental Health: Discussed daily flossing and brushing and regular dental visits     Immunizations reviewed: Risks and Benefits discussed  Discussed benefits of:  Colon Cancer Screening, Mammogram , Cervical Cancer screening, and Screening labs.  BMI Counseling: Body mass index is 25.55 kg/m². Discussed with patient's BMI with her. The BMI is above normal. Exercise recommendations include exercising 3-5 times per week.            Chief Complaint     Chief Complaint   Patient presents with    Physical Exam     Lab review       History of Present Illness     Here for annual exam and lab review  Feels well  No recent illness  No specific concerns or issues        Well Adult Physical   Patient here for a comprehensive physical exam.      Diet and Physical Activity  Diet: well balanced diet  Exercise: daily      Depression Screen  PHQ-2/9 Depression Screening    Little interest or pleasure in doing things: 0 - not at all  Feeling down, depressed, or hopeless: 0 - not at all  PHQ-2 Score: 0  PHQ-2 Interpretation: Negative depression screen     Depression Screening Follow-up Plan:  Patient's depression screening was negative with a PHQ-2 score of 0.  Clinically patient does not have depression. No treatment is required.       General Health  Hearing: Normal:  bilateral  Vision: goes for regular eye exams, most recent eye exam <1 year, and wears contacts  Dental: regular dental visits    Reproductive Health  Post-menopausal       The following portions of the patient's history were reviewed and updated as appropriate: allergies, current medications, past family history, past medical history, past social history, past surgical history and problem list.    Review of Systems     Review of Systems   Constitutional:  Negative for chills, diaphoresis, fatigue and fever.   HENT:  Negative for congestion, ear pain, sinus pressure, sinus pain and sore throat.    Eyes:  Negative for visual disturbance.   Respiratory:  Negative for cough, chest tightness, shortness of breath and wheezing.    Cardiovascular:  Negative for chest pain, palpitations and leg swelling.   Gastrointestinal:  Negative for abdominal distention, abdominal pain, diarrhea and nausea.   Genitourinary:  Negative for dysuria, frequency and urgency.   Musculoskeletal:  Negative for arthralgias, back pain and myalgias.   Skin:  Negative for rash and wound.   Neurological:  Negative for dizziness, weakness and headaches.   Hematological:  Negative for adenopathy.   Psychiatric/Behavioral:  Positive for decreased concentration (takes adderall and helps with focus). Negative for dysphoric mood. The patient is not nervous/anxious.    All other systems reviewed and are negative.      Past Medical History     Past Medical History:   Diagnosis Date    No known health problems        Past Surgical History     Past Surgical History:   Procedure Laterality Date    CHOLECYSTECTOMY      GALLBLADDER SURGERY      WISDOM TOOTH EXTRACTION         Social History     Social History     Socioeconomic History    Marital status: /Civil Union     Spouse  name: None    Number of children: None    Years of education: None    Highest education level: None   Occupational History    None   Tobacco Use    Smoking status: Never     Passive exposure: Never    Smokeless tobacco: Never   Vaping Use    Vaping status: Never Used   Substance and Sexual Activity    Alcohol use: Yes     Comment: social    Drug use: No    Sexual activity: Yes     Partners: Male     Birth control/protection: Male Sterilization   Other Topics Concern    None   Social History Narrative    None     Social Determinants of Health     Financial Resource Strain: Not on file   Food Insecurity: Not on file   Transportation Needs: Not on file   Physical Activity: Not on file   Stress: Not on file   Social Connections: Not on file   Intimate Partner Violence: Not on file   Housing Stability: Not on file       Family History     Family History   Problem Relation Age of Onset    No Known Problems Mother     No Known Problems Father     No Known Problems Sister     No Known Problems Daughter     No Known Problems Paternal Aunt     No Known Problems Paternal Aunt     Substance Abuse Neg Hx     Mental illness Neg Hx        Current Medications       Current Outpatient Medications:     amphetamine-dextroamphetamine (ADDERALL, 20MG,) 20 mg tablet, Take 1 tablet (20 mg total) by mouth daily Max Daily Amount: 20 mg, Disp: 30 tablet, Rfl: 0    fluticasone (FLONASE) 50 mcg/act nasal spray, 1 spray into each nostril 2 (two) times a day, Disp: 16 g, Rfl: 0     Allergies     No Known Allergies    Objective     Recent Results (from the past 672 hour(s))   Vitamin D 25 hydroxy    Collection Time: 04/23/24  7:30 AM   Result Value Ref Range    Vit D, 25-Hydroxy 37.7 30.0 - 100.0 ng/mL   Lipid panel    Collection Time: 04/23/24  7:30 AM   Result Value Ref Range    Cholesterol 240 (H) See Comment mg/dL    Triglycerides 115 See Comment mg/dL    HDL, Direct 70 >=50 mg/dL    LDL Calculated 147 (H) 0 - 100 mg/dL    Non-HDL-Chol  "(CHOL-HDL) 170 mg/dl   Comprehensive metabolic panel    Collection Time: 04/23/24  7:30 AM   Result Value Ref Range    Sodium 141 135 - 147 mmol/L    Potassium 4.3 3.5 - 5.3 mmol/L    Chloride 104 96 - 108 mmol/L    CO2 30 21 - 32 mmol/L    ANION GAP 7 4 - 13 mmol/L    BUN 16 5 - 25 mg/dL    Creatinine 0.84 0.60 - 1.30 mg/dL    Glucose, Fasting 88 65 - 99 mg/dL    Calcium 9.6 8.4 - 10.2 mg/dL    AST 31 13 - 39 U/L    ALT 20 7 - 52 U/L    Alkaline Phosphatase 69 34 - 104 U/L    Total Protein 6.5 6.4 - 8.4 g/dL    Albumin 4.4 3.5 - 5.0 g/dL    Total Bilirubin 0.99 0.20 - 1.00 mg/dL    eGFR 81 ml/min/1.73sq m   CBC and differential    Collection Time: 04/23/24  7:30 AM   Result Value Ref Range    WBC 4.54 4.31 - 10.16 Thousand/uL    RBC 4.71 3.81 - 5.12 Million/uL    Hemoglobin 14.2 11.5 - 15.4 g/dL    Hematocrit 43.8 34.8 - 46.1 %    MCV 93 82 - 98 fL    MCH 30.1 26.8 - 34.3 pg    MCHC 32.4 31.4 - 37.4 g/dL    RDW 13.1 11.6 - 15.1 %    MPV 11.4 8.9 - 12.7 fL    Platelets 247 149 - 390 Thousands/uL    nRBC 0 /100 WBCs    Segmented % 49 43 - 75 %    Immature Grans % 0 0 - 2 %    Lymphocytes % 34 14 - 44 %    Monocytes % 9 4 - 12 %    Eosinophils Relative 7 (H) 0 - 6 %    Basophils Relative 1 0 - 1 %    Absolute Neutrophils 2.22 1.85 - 7.62 Thousands/µL    Absolute Immature Grans 0.01 0.00 - 0.20 Thousand/uL    Absolute Lymphocytes 1.53 0.60 - 4.47 Thousands/µL    Absolute Monocytes 0.41 0.17 - 1.22 Thousand/µL    Eosinophils Absolute 0.32 0.00 - 0.61 Thousand/µL    Basophils Absolute 0.05 0.00 - 0.10 Thousands/µL     Reviewed lab/diagnostic results with pt including both normal and abnormal findings.   In depth counseling and instructions given. All questions answered during visit.     /70 (BP Location: Right arm, Patient Position: Sitting, Cuff Size: Standard)   Pulse 69   Temp 98.4 °F (36.9 °C)   Resp 16   Ht 5' 0.5\" (1.537 m)   Wt 60.3 kg (133 lb)   LMP 12/22/2019 (Approximate)   SpO2 99%   BMI 25.55 " kg/m²      Physical Exam  Constitutional:       General: She is not in acute distress.     Appearance: Normal appearance. She is well-developed. She is not ill-appearing.   HENT:      Head: Normocephalic and atraumatic.      Right Ear: Tympanic membrane and ear canal normal.      Left Ear: Tympanic membrane and ear canal normal.      Nose: Nose normal.      Mouth/Throat:      Mouth: Mucous membranes are moist.      Pharynx: Oropharynx is clear.   Eyes:      General: No scleral icterus.     Extraocular Movements: Extraocular movements intact.      Pupils: Pupils are equal, round, and reactive to light.   Neck:      Thyroid: No thyromegaly.      Vascular: No carotid bruit.   Cardiovascular:      Rate and Rhythm: Normal rate and regular rhythm.      Heart sounds: Normal heart sounds. No murmur heard.  Pulmonary:      Effort: Pulmonary effort is normal. No respiratory distress.      Breath sounds: Normal breath sounds. No wheezing or rales.   Abdominal:      General: Bowel sounds are normal. There is no distension.      Palpations: Abdomen is soft.      Tenderness: There is no abdominal tenderness.   Musculoskeletal:         General: Normal range of motion.      Cervical back: Normal range of motion and neck supple.      Right lower leg: No edema.      Left lower leg: No edema.   Lymphadenopathy:      Cervical: No cervical adenopathy.   Skin:     General: Skin is warm and dry.      Coloration: Skin is not jaundiced or pale.   Neurological:      General: No focal deficit present.      Mental Status: She is alert and oriented to person, place, and time.      Cranial Nerves: No cranial nerve deficit.      Sensory: No sensory deficit.      Coordination: Coordination normal.      Gait: Gait normal.   Psychiatric:         Mood and Affect: Mood normal.         Behavior: Behavior normal.         Thought Content: Thought content normal.         Judgment: Judgment normal.                   ZENOBIA Reed  [facility]  FAMILY PRACTICE

## 2024-05-17 ENCOUNTER — TELEMEDICINE (OUTPATIENT)
Dept: FAMILY MEDICINE CLINIC | Facility: CLINIC | Age: 49
End: 2024-05-17
Payer: COMMERCIAL

## 2024-05-17 VITALS — WEIGHT: 130 LBS | HEIGHT: 61 IN | BODY MASS INDEX: 24.55 KG/M2

## 2024-05-17 DIAGNOSIS — F90.1 ADHD (ATTENTION DEFICIT HYPERACTIVITY DISORDER), PREDOMINANTLY HYPERACTIVE IMPULSIVE TYPE: ICD-10-CM

## 2024-05-17 PROCEDURE — 99213 OFFICE O/P EST LOW 20 MIN: CPT | Performed by: NURSE PRACTITIONER

## 2024-05-17 RX ORDER — DEXTROAMPHETAMINE SACCHARATE, AMPHETAMINE ASPARTATE, DEXTROAMPHETAMINE SULFATE AND AMPHETAMINE SULFATE 5; 5; 5; 5 MG/1; MG/1; MG/1; MG/1
20 TABLET ORAL DAILY
Qty: 30 TABLET | Refills: 0 | Status: SHIPPED | OUTPATIENT
Start: 2024-05-17

## 2024-05-17 NOTE — PROGRESS NOTES
Virtual Regular Visit    Verification of patient location:    Patient is located at Home in the following state in which I hold an active license [place]      Assessment/Plan:  1. ADHD (attention deficit hyperactivity disorder), predominantly hyperactive impulsive type  Patient clinically stable today.  Given that the patient reports overall improved level functioning without significant side effects, I felt a reasonable to continue the patient on current agent and dosing schedule as needed for ADDH  Continue current treatment plan.  Reviewed .  Review Appropriate. Patient is not receiving medications from other  Providers.  No physical or psychological signs of dependence.  Patient compliant with our agreement.  Again discussed the risks of developing physical/psychological dependence of the controlled substance.  Discussed risks, dangers of addiction overdose.  Discussed dangers of concurrent use of current medications with alcohol, benzos and other depressants.  Side effects include but are not limited to nausea, vomiting, GI intolerance, sedation, constipation, mental clouding, opioid-induced hyperalgesia, endocrine dysfunction, addiction, dependence, and tolerance. The patient was asked to take his medications only as prescribed and directed, never in excess, and never for any other reason other than for ADD/ADHD. The patient was also asked to keep his medications out of the reach of others and away from children, preferably in a locked drawer.  Patient is advised that he/she must comply with prescribed treatment plans related to current  disorder including counseling, follow-up, use of adjunctive non pharmacologic approaches.  If he/she does not comply with prescribed treatment plans or violate controlled substance agreement he may be subject to dismissal from this practice.  He/ she verbalizes understanding and agreement of the above.    - amphetamine-dextroamphetamine (ADDERALL, 20MG,) 20 mg tablet; Take 1  tablet (20 mg total) by mouth daily Max Daily Amount: 20 mg  Dispense: 30 tablet; Refill: 0    Problem List Items Addressed This Visit    None           Reason for visit is   Chief Complaint   Patient presents with    Follow-up     Medication     Virtual Regular Visit          Encounter provider ZENOBIA Reed      Recent Visits  No visits were found meeting these conditions.  Showing recent visits within past 7 days and meeting all other requirements  Today's Visits  Date Type Provider Dept   05/17/24 Telemedicine ZENOBIA Reed [facility]   Showing today's visits and meeting all other requirements  Future Appointments  No visits were found meeting these conditions.  Showing future appointments within next 150 days and meeting all other requirements       The patient was identified by name and date of birth. Nica Weinstein was informed that this is a telemedicine visit and that the visit is being conducted through the [employer] platform. She agrees to proceed..  My office door was closed. No one else was in the room.  She acknowledged consent and understanding of privacy and security of the video platform. The patient has agreed to participate and understands they can discontinue the visit at any time.    Patient is aware this is a billable service.     Subjective  Nica Weinstein is a 49 y.o. female , med check      Follow up on adhd/med check  On adderall 20mg daily   Reports notices definite improvement in focus, concentration, and productivity when taking med.   No side effects  Thinks med is working well.   No other issues or concerns.          Past Medical History:   Diagnosis Date    No known health problems        Past Surgical History:   Procedure Laterality Date    CHOLECYSTECTOMY      GALLBLADDER SURGERY      WISDOM TOOTH EXTRACTION         Current Outpatient Medications   Medication Sig Dispense Refill    amphetamine-dextroamphetamine (ADDERALL, 20MG,) 20 mg tablet Take 1 tablet (20 mg  "total) by mouth daily Max Daily Amount: 20 mg 30 tablet 0    fluticasone (FLONASE) 50 mcg/act nasal spray 1 spray into each nostril 2 (two) times a day 16 g 0     No current facility-administered medications for this visit.        No Known Allergies    Review of Systems   Constitutional:  Negative for unexpected weight change.   Respiratory:  Negative for chest tightness.    Cardiovascular:  Negative for palpitations.   Psychiatric/Behavioral:  Positive for decreased concentration.        Video Exam    Vitals:    05/17/24 0829   Weight: 59 kg (130 lb)   Height: 5' 0.5\" (1.537 m)       Physical Exam  Constitutional:       General: She is not in acute distress.     Appearance: She is not ill-appearing.   Pulmonary:      Effort: Pulmonary effort is normal.   Skin:     Coloration: Skin is not jaundiced or pale.   Neurological:      Mental Status: She is alert and oriented to person, place, and time.   Psychiatric:         Mood and Affect: Mood normal.         Behavior: Behavior normal.         Thought Content: Thought content normal.         Judgment: Judgment normal.          Visit Time  Total Visit Duration: 8 minutes spent counseling on meds, tx plan, documenting in record, obtaining updated history.   Patient was counseled regarding instructions for management which included: impression/diagnosis, risk/benefits of treatment options, importance of compliance with treatment, risk factor reduction, and prognosis.   I have reviewed the instructions with the patient answering all questions and patient verbalized understanding.          "

## 2024-06-14 ENCOUNTER — TELEMEDICINE (OUTPATIENT)
Dept: FAMILY MEDICINE CLINIC | Facility: CLINIC | Age: 49
End: 2024-06-14
Payer: COMMERCIAL

## 2024-06-14 DIAGNOSIS — F90.1 ADHD (ATTENTION DEFICIT HYPERACTIVITY DISORDER), PREDOMINANTLY HYPERACTIVE IMPULSIVE TYPE: ICD-10-CM

## 2024-06-14 PROCEDURE — 99213 OFFICE O/P EST LOW 20 MIN: CPT | Performed by: NURSE PRACTITIONER

## 2024-06-14 RX ORDER — DEXTROAMPHETAMINE SACCHARATE, AMPHETAMINE ASPARTATE, DEXTROAMPHETAMINE SULFATE AND AMPHETAMINE SULFATE 5; 5; 5; 5 MG/1; MG/1; MG/1; MG/1
20 TABLET ORAL DAILY
Qty: 30 TABLET | Refills: 0 | Status: SHIPPED | OUTPATIENT
Start: 2024-06-14

## 2024-06-14 NOTE — PROGRESS NOTES
Virtual Regular Visit    Verification of patient location:    Patient is located at Home in the following state in which I hold an active license [place]      Assessment/Plan:  1. ADHD (attention deficit hyperactivity disorder), predominantly hyperactive impulsive type  Patient clinically stable today.  Given that the patient reports overall improved level functioning without significant side effects, I felt a reasonable to continue the patient on current agent and dosing schedule as needed for ADDH  Continue current treatment plan.  Reviewed .  Review Appropriate. Patient is not receiving medications from other  Providers.  No physical or psychological signs of dependence.  Patient compliant with our agreement.  Again discussed the risks of developing physical/psychological dependence of the controlled substance.  Discussed risks, dangers of addiction overdose.  Discussed dangers of concurrent use of current medications with alcohol, benzos and other depressants.  Side effects include but are not limited to nausea, vomiting, GI intolerance, sedation, constipation, mental clouding, opioid-induced hyperalgesia, endocrine dysfunction, addiction, dependence, and tolerance. The patient was asked to take his medications only as prescribed and directed, never in excess, and never for any other reason other than for ADD/ADHD. The patient was also asked to keep his medications out of the reach of others and away from children, preferably in a locked drawer.  Patient is advised that he/she must comply with prescribed treatment plans related to current  disorder including counseling, follow-up, use of adjunctive non pharmacologic approaches.  If he/she does not comply with prescribed treatment plans or violate controlled substance agreement he may be subject to dismissal from this practice.  He/ she verbalizes understanding and agreement of the above.    - amphetamine-dextroamphetamine (ADDERALL, 20MG,) 20 mg tablet; Take 1  tablet (20 mg total) by mouth daily Max Daily Amount: 20 mg  Dispense: 30 tablet; Refill: 0    Problem List Items Addressed This Visit       ADHD (attention deficit hyperactivity disorder), predominantly hyperactive impulsive type            Reason for visit is   Chief Complaint   Patient presents with    med check      [facility]     Virtual Regular Visit          Encounter provider ZENOBIA Reed      Recent Visits  No visits were found meeting these conditions.  Showing recent visits within past 7 days and meeting all other requirements  Today's Visits  Date Type Provider Dept   06/14/24 Telemedicine ZENOBIA Reed  Zac Guzman   Showing today's visits and meeting all other requirements  Future Appointments  No visits were found meeting these conditions.  Showing future appointments within next 150 days and meeting all other requirements       The patient was identified by name and date of birth. Nica Weinstein was informed that this is a telemedicine visit and that the visit is being conducted through the [employer] platform. She agrees to proceed..  My office door was closed. No one else was in the room.  She acknowledged consent and understanding of privacy and security of the video platform. The patient has agreed to participate and understands they can discontinue the visit at any time.    Patient is aware this is a billable service.     Subjective  Nica Weinstein is a 49 y.o. female , med check      Follow up ADHD.   On adderall 20mg daily  Reports improvement in concentration, focus, and productivity when taking medication.   No side effects.   No other issues or concerns.          Past Medical History:   Diagnosis Date    No known health problems        Past Surgical History:   Procedure Laterality Date    CHOLECYSTECTOMY      GALLBLADDER SURGERY      WISDOM TOOTH EXTRACTION         Current Outpatient Medications   Medication Sig Dispense Refill    amphetamine-dextroamphetamine (ADDERALL, 20MG,) 20  mg tablet Take 1 tablet (20 mg total) by mouth daily Max Daily Amount: 20 mg 30 tablet 0    fluticasone (FLONASE) 50 mcg/act nasal spray 1 spray into each nostril 2 (two) times a day 16 g 0     No current facility-administered medications for this visit.        No Known Allergies    Review of Systems   Constitutional:  Negative for unexpected weight change.   Respiratory:  Negative for chest tightness.    Cardiovascular:  Negative for palpitations.   Allergic/Immunologic: Negative for immunocompromised state.   Psychiatric/Behavioral:  Positive for decreased concentration. Negative for dysphoric mood. The patient is not nervous/anxious.        Video Exam    There were no vitals filed for this visit.    Physical Exam  Constitutional:       General: She is not in acute distress.  Pulmonary:      Effort: Pulmonary effort is normal. No respiratory distress.   Skin:     Coloration: Skin is not jaundiced or pale.   Neurological:      Mental Status: She is alert and oriented to person, place, and time.   Psychiatric:         Mood and Affect: Mood normal.         Behavior: Behavior normal.         Thought Content: Thought content normal.         Judgment: Judgment normal.          Visit Time  Total Visit Duration: 10 minutes spent counseling pt on dx/impression, medication, treatment plan, obtaining history, and documenting in record.  Patient was counseled regarding instructions for management which included: impression/diagnosis, risk/benefits of treatment options, importance of compliance with treatment, risk factor reduction, and prognosis.   I have reviewed the instructions with the patient answering all questions and patient verbalized understanding.

## 2024-06-24 NOTE — PATIENT INSTRUCTIONS
Call placed to mother. Appt moved up.   Heart healthy, carbohydrate controlled diet- limit red meat, limit saturated fat, moderate salt intake, limit junk food, etc.   Regular exercise  Stress management  Routine labwork and screenings as ordered.   Recommend over the counter fish oil/omega 3, 1000mg daily  Recommend calcium 1000-1200mg per day, in divided doses (500-600mg twice daily)

## 2024-07-12 ENCOUNTER — TELEMEDICINE (OUTPATIENT)
Dept: FAMILY MEDICINE CLINIC | Facility: CLINIC | Age: 49
End: 2024-07-12
Payer: COMMERCIAL

## 2024-07-12 ENCOUNTER — TELEPHONE (OUTPATIENT)
Dept: FAMILY MEDICINE CLINIC | Facility: CLINIC | Age: 49
End: 2024-07-12

## 2024-07-12 DIAGNOSIS — F90.1 ADHD (ATTENTION DEFICIT HYPERACTIVITY DISORDER), PREDOMINANTLY HYPERACTIVE IMPULSIVE TYPE: ICD-10-CM

## 2024-07-12 PROCEDURE — 99213 OFFICE O/P EST LOW 20 MIN: CPT | Performed by: NURSE PRACTITIONER

## 2024-07-12 RX ORDER — DEXTROAMPHETAMINE SACCHARATE, AMPHETAMINE ASPARTATE, DEXTROAMPHETAMINE SULFATE AND AMPHETAMINE SULFATE 5; 5; 5; 5 MG/1; MG/1; MG/1; MG/1
20 TABLET ORAL DAILY
Qty: 30 TABLET | Refills: 0 | Status: SHIPPED | OUTPATIENT
Start: 2024-07-12

## 2024-07-12 NOTE — PROGRESS NOTES
Virtual Regular Visit  Name: Nica Weinstein      : 1975      MRN: [id number]  Encounter Provider: ZENOBIA Reed  Encounter Date: 2024   Encounter department: [facility]    Verification of patient location:    Patient is located at Home in the following state in which I hold an active license [place]    Assessment & Plan     1. ADHD (attention deficit hyperactivity disorder), predominantly hyperactive impulsive type  Patient clinically stable today.  Given that the patient reports overall improved level functioning without significant side effects, I felt a reasonable to continue the patient on current agent and dosing schedule as needed for ADDH  Continue current treatment plan.  Reviewed .  Review Appropriate. Patient is not receiving medications from other  Providers.  No physical or psychological signs of dependence.  Patient compliant with our agreement.  Again discussed the risks of developing physical/psychological dependence of the controlled substance.  Discussed risks, dangers of addiction overdose.  Discussed dangers of concurrent use of current medications with alcohol, benzos and other depressants.  Side effects include but are not limited to nausea, vomiting, GI intolerance, sedation, constipation, mental clouding, opioid-induced hyperalgesia, endocrine dysfunction, addiction, dependence, and tolerance. The patient was asked to take his medications only as prescribed and directed, never in excess, and never for any other reason other than for ADD/ADHD. The patient was also asked to keep his medications out of the reach of others and away from children, preferably in a locked drawer.  Patient is advised that he/she must comply with prescribed treatment plans related to current  disorder including counseling, follow-up, use of adjunctive non pharmacologic approaches.  If he/she does not comply with prescribed treatment plans or violate controlled substance  agreement he may be subject to dismissal from this practice.  He/ she verbalizes understanding and agreement of the above.    - amphetamine-dextroamphetamine (ADDERALL, 20MG,) 20 mg tablet; Take 1 tablet (20 mg total) by mouth daily Max Daily Amount: 20 mg  Dispense: 30 tablet; Refill: 0         Encounter provider ZENOBIA Reed    The patient was identified by name and date of birth. Nica Weinstein was informed that this is a telemedicine visit and that the visit is being conducted through the [employer] platform. She agrees to proceed..  My office door was closed. No one else was in the room.  She acknowledged consent and understanding of privacy and security of the video platform. The patient has agreed to participate and understands they can discontinue the visit at any time.    Patient is aware this is a billable service.     History of Present Illness     Follow up on ADHD/med check  On adderall 20mg daily.   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 Taking only as needed right now.   Reports significant improvement in focus , concentration, and productivity when taking medication.   Actually did not even pick med up from pharmacy last month but will need it now.   No other issues or concerns.           Review of Systems   Constitutional:  Negative for unexpected weight change.   Respiratory:  Negative for chest tightness.    Cardiovascular:  Negative for palpitations.   Gastrointestinal:  Negative for nausea.   Skin:  Negative for rash.   Allergic/Immunologic: Negative for immunocompromised state.   Psychiatric/Behavioral:  Positive for decreased concentration. Negative for dysphoric mood. The patient is not nervous/anxious.        Objective     LMP 12/22/2019 (Approximate)   Physical Exam  Constitutional:       General: She is not in acute distress.     Appearance: She is not ill-appearing.   Pulmonary:      Effort: Pulmonary effort is normal.   Skin:     Coloration: Skin  is not jaundiced or pale.   Neurological:      Mental Status: She is alert and oriented to person, place, and time.   Psychiatric:         Mood and Affect: Mood normal.         Behavior: Behavior normal.         Thought Content: Thought content normal.         Judgment: Judgment normal.         Visit Time  Total Visit Duration: 8 minutes spent counseling pt on impression/dx, meds, tx plan, updating and documenting in record  Patient was counseled regarding instructions for management which included: impression/diagnosis, risk/benefits of treatment options, importance of compliance with treatment, risk factor reduction, and prognosis.   I have reviewed the instructions with the patient answering all questions and patient verbalized understanding.

## 2024-08-09 ENCOUNTER — TELEMEDICINE (OUTPATIENT)
Dept: FAMILY MEDICINE CLINIC | Facility: CLINIC | Age: 49
End: 2024-08-09
Payer: COMMERCIAL

## 2024-08-09 DIAGNOSIS — F90.1 ADHD (ATTENTION DEFICIT HYPERACTIVITY DISORDER), PREDOMINANTLY HYPERACTIVE IMPULSIVE TYPE: ICD-10-CM

## 2024-08-09 PROCEDURE — 99213 OFFICE O/P EST LOW 20 MIN: CPT | Performed by: NURSE PRACTITIONER

## 2024-08-09 NOTE — PROGRESS NOTES
Virtual Regular Visit  Name: Nica Weinstein      : 1975      MRN: [id number]  Encounter Provider: ZENOBIA Reed  Encounter Date: 2024   Encounter department: [facility]    Verification of patient location:    Patient is located at Home in the following state in which I hold an active license [place]    Assessment & Plan   1. ADHD (attention deficit hyperactivity disorder), predominantly hyperactive impulsive type  Patient clinically stable today.  Given that the patient reports overall improved level functioning without significant side effects, I felt a reasonable to continue the patient on current agent and dosing schedule as needed for ADDH  Continue current treatment plan.  Reviewed .  Review Appropriate. Patient is not receiving medications from other  Providers.  No physical or psychological signs of dependence.  Patient compliant with our agreement.  Again discussed the risks of developing physical/psychological dependence of the controlled substance.  Discussed risks, dangers of addiction overdose.  Discussed dangers of concurrent use of current medications with alcohol, benzos and other depressants.  Side effects include but are not limited to nausea, vomiting, GI intolerance, sedation, constipation, mental clouding, opioid-induced hyperalgesia, endocrine dysfunction, addiction, dependence, and tolerance. The patient was asked to take his medications only as prescribed and directed, never in excess, and never for any other reason other than for ADD/ADHD. The patient was also asked to keep his medications out of the reach of others and away from children, preferably in a locked drawer.  Patient is advised that he/she must comply with prescribed treatment plans related to current  disorder including counseling, follow-up, use of adjunctive non pharmacologic approaches.  If he/she does not comply with prescribed treatment plans or violate controlled substance agreement  he may be subject to dismissal from this practice.  He/ she verbalizes understanding and agreement of the above.         Encounter provider ZENOBIA Reed    The patient was identified by name and date of birth. Nica Weinstein was informed that this is a telemedicine visit and that the visit is being conducted through the [employer] platform. She agrees to proceed..  My office door was closed. No one else was in the room.  She acknowledged consent and understanding of privacy and security of the video platform. The patient has agreed to participate and understands they can discontinue the visit at any time.    Patient is aware this is a billable service.     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   Has been off from school so has not been taking and does not rx right now.           Review of Systems   Constitutional:  Negative for unexpected weight change.   Allergic/Immunologic: Negative for immunocompromised state.   Psychiatric/Behavioral:  Positive for decreased concentration. Negative for dysphoric mood. The patient is not nervous/anxious.        Objective     LMP 12/22/2019 (Approximate)   Physical Exam  Constitutional:       General: She is not in acute distress.  Pulmonary:      Effort: Pulmonary effort is normal.   Neurological:      Mental Status: She is alert and oriented to person, place, and time.   Psychiatric:         Mood and Affect: Mood normal.         Behavior: Behavior normal.         Visit Time  Total Visit Duration: 5 minutes spent counseling pt on dx/impression, meds, tx plan. Patient was counseled regarding instructions for management which included: impression/diagnosis, risk/benefits of treatment options, importance of compliance with treatment, risk factor reduction, and prognosis.   I have reviewed the instructions with the patient  answering all questions and patient verbalized understanding.

## 2024-08-19 ENCOUNTER — TELEPHONE (OUTPATIENT)
Dept: FAMILY MEDICINE CLINIC | Facility: CLINIC | Age: 49
End: 2024-08-19

## 2024-08-19 NOTE — TELEPHONE ENCOUNTER
Received fax on 8/19 from [facility] with release from patient requesting last 3 progress notes and labs.  Faxed 8/19.

## 2024-09-09 ENCOUNTER — OFFICE VISIT (OUTPATIENT)
Dept: FAMILY MEDICINE CLINIC | Facility: CLINIC | Age: 49
End: 2024-09-09
Payer: COMMERCIAL

## 2024-09-09 VITALS
RESPIRATION RATE: 18 BRPM | TEMPERATURE: 97.3 F | SYSTOLIC BLOOD PRESSURE: 124 MMHG | WEIGHT: 133.4 LBS | HEART RATE: 76 BPM | DIASTOLIC BLOOD PRESSURE: 76 MMHG | BODY MASS INDEX: 25.62 KG/M2

## 2024-09-09 DIAGNOSIS — M62.89: ICD-10-CM

## 2024-09-09 DIAGNOSIS — M79.652 PAIN IN LEFT THIGH: ICD-10-CM

## 2024-09-09 DIAGNOSIS — S76.912A MUSCLE STRAIN OF LEFT THIGH, INITIAL ENCOUNTER: Primary | ICD-10-CM

## 2024-09-09 PROCEDURE — 99213 OFFICE O/P EST LOW 20 MIN: CPT | Performed by: NURSE PRACTITIONER

## 2024-09-09 RX ORDER — NAPROXEN 500 MG/1
500 TABLET ORAL 2 TIMES DAILY WITH MEALS
Qty: 60 TABLET | Refills: 0 | Status: SHIPPED | OUTPATIENT
Start: 2024-09-09

## 2024-09-09 NOTE — PROGRESS NOTES
Ambulatory Visit  Name: Nica Weinstein      : 1975      MRN: [id number]  Encounter Provider: ZENOBIA Reed  Encounter Date: 2024   Encounter department: [facility]    Assessment & Plan   1. Muscle strain of left thigh, initial encounter  Ultrasound to be scheduled as discussed  Naprosyn 500mg twice daily with food for one week and then as needed.   Ice/cold compresses may be helpful  Call or return to office if symptoms worsen or if new symptoms develop.     - US MSK limited; Future  - naproxen (NAPROSYN) 500 mg tablet; Take 1 tablet (500 mg total) by mouth 2 (two) times a day with meals  Dispense: 60 tablet; Refill: 0    2. Enlarged muscle  - US MSK limited; Future  - naproxen (NAPROSYN) 500 mg tablet; Take 1 tablet (500 mg total) by mouth 2 (two) times a day with meals  Dispense: 60 tablet; Refill: 0    3. Pain in left thigh  - US MSK limited; Future  - naproxen (NAPROSYN) 500 mg tablet; Take 1 tablet (500 mg total) by mouth 2 (two) times a day with meals  Dispense: 60 tablet; Refill: 0       History of Present Illness     Pain left leg, groin area  Started 3-4 days ago.   No known injury  Sharp pain  then leg becomes very painful and hard to move.   Heat/hot water makes pain worse.   No numbness or tingling   No rash         Review of Systems   Cardiovascular:  Negative for leg swelling.   Musculoskeletal:  Positive for myalgias.   Skin:  Negative for rash and wound.   Neurological:  Negative for numbness.       Objective     /76   Pulse 76   Temp (!) 97.3 °F (36.3 °C)   Resp 18   Wt 60.5 kg (133 lb 6.4 oz)   LMP 2019 (Approximate)   BMI 25.62 kg/m²     Physical Exam  Vitals reviewed.   Constitutional:       General: She is not in acute distress.  Cardiovascular:      Rate and Rhythm: Normal rate.   Pulmonary:      Effort: Pulmonary effort is normal.   Musculoskeletal:         General: Swelling and tenderness present. No deformity or signs of injury.       Comments: Palpable area left medial thigh, muscle enlarged area approx 6 inch x 4 inch   Skin:     Findings: No erythema, lesion or rash.   Neurological:      Mental Status: She is alert and oriented to person, place, and time.   Psychiatric:         Mood and Affect: Mood normal.       Administrative Statements

## 2024-09-09 NOTE — PATIENT INSTRUCTIONS
Ultrasound to be scheduled as discussed  Naprosyn 500mg twice daily with food for one week and then as needed.   Ice/cold compresses may be helpful  Call or return to office if symptoms worsen or if new symptoms develop.      No

## 2024-09-20 ENCOUNTER — TELEPHONE (OUTPATIENT)
Dept: FAMILY MEDICINE CLINIC | Facility: CLINIC | Age: 49
End: 2024-09-20

## 2024-09-20 NOTE — TELEPHONE ENCOUNTER
----- Message from ZENOBIA Reed sent at 9/20/2024  6:58 AM EDT -----  Please call pt   Ultrasound normal. No abnormality, no tears.   Suspect muscle sprain/strain  How is she doing?

## 2024-11-06 DIAGNOSIS — F90.1 ADHD (ATTENTION DEFICIT HYPERACTIVITY DISORDER), PREDOMINANTLY HYPERACTIVE IMPULSIVE TYPE: ICD-10-CM

## 2024-11-07 RX ORDER — DEXTROAMPHETAMINE SACCHARATE, AMPHETAMINE ASPARTATE, DEXTROAMPHETAMINE SULFATE AND AMPHETAMINE SULFATE 5; 5; 5; 5 MG/1; MG/1; MG/1; MG/1
20 TABLET ORAL DAILY
Qty: 30 TABLET | Refills: 0 | OUTPATIENT
Start: 2024-11-07

## 2024-11-14 ENCOUNTER — TELEMEDICINE (OUTPATIENT)
Dept: FAMILY MEDICINE CLINIC | Facility: CLINIC | Age: 49
End: 2024-11-14
Payer: COMMERCIAL

## 2024-11-14 DIAGNOSIS — J30.2 SEASONAL ALLERGIES: ICD-10-CM

## 2024-11-14 DIAGNOSIS — F90.1 ADHD (ATTENTION DEFICIT HYPERACTIVITY DISORDER), PREDOMINANTLY HYPERACTIVE IMPULSIVE TYPE: ICD-10-CM

## 2024-11-14 PROCEDURE — 99213 OFFICE O/P EST LOW 20 MIN: CPT | Performed by: NURSE PRACTITIONER

## 2024-11-14 RX ORDER — FLUTICASONE PROPIONATE 50 MCG
1 SPRAY, SUSPENSION (ML) NASAL AS NEEDED
Qty: 16 G | Refills: 1 | Status: SHIPPED | OUTPATIENT
Start: 2024-11-14

## 2024-11-14 RX ORDER — DEXTROAMPHETAMINE SACCHARATE, AMPHETAMINE ASPARTATE, DEXTROAMPHETAMINE SULFATE AND AMPHETAMINE SULFATE 5; 5; 5; 5 MG/1; MG/1; MG/1; MG/1
20 TABLET ORAL DAILY
Qty: 30 TABLET | Refills: 0 | Status: SHIPPED | OUTPATIENT
Start: 2024-11-14

## 2024-11-14 RX ORDER — HYDROXYZINE HYDROCHLORIDE 10 MG/1
10 TABLET, FILM COATED ORAL 3 TIMES DAILY
COMMUNITY
Start: 2024-10-27

## 2024-11-14 NOTE — ASSESSMENT & PLAN NOTE
Patient clinically stable today.  Given that the patient reports overall improved level functioning without significant side effects, I felt a reasonable to continue the patient on current agent and dosing schedule as needed for ADDH  Continue current treatment plan.  Reviewed .  Review Appropriate. Patient is not receiving medications from other  Providers.  No physical or psychological signs of dependence.  Patient compliant with our agreement.  Again discussed the risks of developing physical/psychological dependence of the controlled substance.  Discussed risks, dangers of addiction overdose.  Discussed dangers of concurrent use of current medications with alcohol, benzos and other depressants.  Side effects include but are not limited to nausea, vomiting, GI intolerance, sedation, constipation, mental clouding, opioid-induced hyperalgesia, endocrine dysfunction, addiction, dependence, and tolerance. The patient was asked to take his medications only as prescribed and directed, never in excess, and never for any other reason other than for ADD/ADHD. The patient was also asked to keep his medications out of the reach of others and away from children, preferably in a locked drawer.  Patient is advised that he/she must comply with prescribed treatment plans related to current  disorder including counseling, follow-up, use of adjunctive non pharmacologic approaches.  If he/she does not comply with prescribed treatment plans or violate controlled substance agreement he may be subject to dismissal from this practice.  He/ she verbalizes understanding and agreement of the above.    Orders:    amphetamine-dextroamphetamine (ADDERALL, 20MG,) 20 mg tablet; Take 1 tablet (20 mg total) by mouth daily Max Daily Amount: 20 mg

## 2024-11-14 NOTE — ASSESSMENT & PLAN NOTE
Orders:    fluticasone (FLONASE) 50 mcg/act nasal spray; 1 spray into each nostril if needed for rhinitis or allergies

## 2024-11-14 NOTE — PROGRESS NOTES
Virtual Regular Visit  Name: Nica Weinstein      : 1975      MRN: [id number]  Encounter Provider: ZENOBIA Reed  Encounter Date: 2024   Encounter department: [facility]    Verification of patient location:    Patient is located at Home in the following state in which I hold an active license [place]    hospitals  Virtual visit to follow up on ADHD and med check  Taking adderall 20mg  Does not take every day  Tolerating with no side effects  Pt reports significant improvement in focus, concentration, and productivity when taking medication.   Needs refill on flonase. Takes for allergies as needed.   No other issues or concerns.       ROS  Not immunocompromised.   Denies sob, chest pain, palpitations.   No headache or dizziness  Does report difficulty with concentration and focus.   Has allergies  Otherwise ROS negative  :  Assessment & Plan  ADHD (attention deficit hyperactivity disorder), predominantly hyperactive impulsive type  Patient clinically stable today.  Given that the patient reports overall improved level functioning without significant side effects, I felt a reasonable to continue the patient on current agent and dosing schedule as needed for ADDH  Continue current treatment plan.  Reviewed .  Review Appropriate. Patient is not receiving medications from other  Providers.  No physical or psychological signs of dependence.  Patient compliant with our agreement.  Again discussed the risks of developing physical/psychological dependence of the controlled substance.  Discussed risks, dangers of addiction overdose.  Discussed dangers of concurrent use of current medications with alcohol, benzos and other depressants.  Side effects include but are not limited to nausea, vomiting, GI intolerance, sedation, constipation, mental clouding, opioid-induced hyperalgesia, endocrine dysfunction, addiction, dependence, and tolerance. The patient was asked to take his medications  only as prescribed and directed, never in excess, and never for any other reason other than for ADD/ADHD. The patient was also asked to keep his medications out of the reach of others and away from children, preferably in a locked drawer.  Patient is advised that he/she must comply with prescribed treatment plans related to current  disorder including counseling, follow-up, use of adjunctive non pharmacologic approaches.  If he/she does not comply with prescribed treatment plans or violate controlled substance agreement he may be subject to dismissal from this practice.  He/ she verbalizes understanding and agreement of the above.    Orders:    amphetamine-dextroamphetamine (ADDERALL, 20MG,) 20 mg tablet; Take 1 tablet (20 mg total) by mouth daily Max Daily Amount: 20 mg    Seasonal allergies  Orders:    fluticasone (FLONASE) 50 mcg/act nasal spray; 1 spray into each nostril if needed for rhinitis or allergies                   Encounter provider ZENOBIA Reed    The patient was identified by name and date of birth. Nica Weinstein was informed that this is a telemedicine visit and that the visit is being conducted through the [employer] platform. She agrees to proceed..  My office door was closed. No one else was in the room.  She acknowledged consent and understanding of privacy and security of the video platform. The patient has agreed to participate and understands they can discontinue the visit at any time.    Patient is aware this is a billable service.     History of Present Illness   History of Present Illness   Objective     LMP 12/22/2019 (Approximate)   Physical Exam  Constitutional:       General: She is not in acute distress.     Appearance: She is not ill-appearing.   Pulmonary:      Effort: Pulmonary effort is normal.   Neurological:      Mental Status: She is alert and oriented to person, place, and time.   Psychiatric:         Mood and Affect: Mood normal.         Behavior: Behavior normal.          Thought Content: Thought content normal.         Judgment: Judgment normal.         Visit Time  Total Visit Duration: 10 minutes spent counseling pt regarding Dx/impression, meds, tx plan, history review, and documentation.   Patient was counseled regarding instructions for management which included: impression/diagnosis, risk/benefits of treatment options, importance of compliance with treatment, risk factor reduction, and prognosis.   I have reviewed the instructions with the patient answering all questions and patient verbalized understanding.

## 2024-12-12 ENCOUNTER — TELEMEDICINE (OUTPATIENT)
Dept: FAMILY MEDICINE CLINIC | Facility: CLINIC | Age: 49
End: 2024-12-12
Payer: COMMERCIAL

## 2024-12-12 DIAGNOSIS — F90.1 ADHD (ATTENTION DEFICIT HYPERACTIVITY DISORDER), PREDOMINANTLY HYPERACTIVE IMPULSIVE TYPE: ICD-10-CM

## 2024-12-12 PROCEDURE — 99213 OFFICE O/P EST LOW 20 MIN: CPT | Performed by: NURSE PRACTITIONER

## 2024-12-12 RX ORDER — DEXTROAMPHETAMINE SACCHARATE, AMPHETAMINE ASPARTATE, DEXTROAMPHETAMINE SULFATE AND AMPHETAMINE SULFATE 5; 5; 5; 5 MG/1; MG/1; MG/1; MG/1
20 TABLET ORAL DAILY
Qty: 30 TABLET | Refills: 0 | Status: SHIPPED | OUTPATIENT
Start: 2024-12-12

## 2024-12-12 NOTE — PROGRESS NOTES
Virtual Regular Visit  Name: Nica Weinstein      : 1975      MRN: [id number]  Encounter Provider: ZENOBIA Reed  Encounter Date: 2024   Encounter department: [facility]      Verification of patient location:  Patient is located at Home in the following state in which I hold an active license [place] :  Assessment & Plan  ADHD (attention deficit hyperactivity disorder), predominantly hyperactive impulsive type  Orders:    amphetamine-dextroamphetamine (ADDERALL, 20MG,) 20 mg tablet; Take 1 tablet (20 mg total) by mouth daily Max Daily Amount: 20 mg    Patient clinically stable today.  Given that the patient reports overall improved level functioning without significant side effects, I felt a reasonable to continue the patient on current agent and dosing schedule as needed for ADDH  Continue current treatment plan.  Reviewed .  Review Appropriate. Patient is not receiving medications from other  Providers.  No physical or psychological signs of dependence.  Patient compliant with our agreement.  Again discussed the risks of developing physical/psychological dependence of the controlled substance.  Discussed risks, dangers of addiction overdose.  Discussed dangers of concurrent use of current medications with alcohol, benzos and other depressants.  Side effects include but are not limited to nausea, vomiting, GI intolerance, sedation, constipation, mental clouding, opioid-induced hyperalgesia, endocrine dysfunction, addiction, dependence, and tolerance. The patient was asked to take his medications only as prescribed and directed, never in excess, and never for any other reason other than for ADD/ADHD. The patient was also asked to keep his medications out of the reach of others and away from children, preferably in a locked drawer.  Patient is advised that he/she must comply with prescribed treatment plans related to current  disorder including counseling, follow-up, use of  adjunctive non pharmacologic approaches.  If he/she does not comply with prescribed treatment plans or violate controlled substance agreement he may be subject to dismissal from this practice.  He/ she verbalizes understanding and agreement of the above.          Encounter provider ZENOBIA Reed    The patient was identified by name and date of birth. Nica Weinstein was informed that this is a telemedicine visit and that the visit is being conducted through the [employer] platform. She agrees to proceed..  My office door was closed. No one else was in the room.  She acknowledged consent and understanding of privacy and security of the video platform. The patient has agreed to participate and understands they can discontinue the visit at any time.    Patient is aware this is a billable service.     History of Present Illness     Follow up on ADHD/med check  Currently on Adderall 20mg daily. Take usually Monday through Friday when working.   Taking as prescribed.   Tolerating with no side effects  Pt reports significant improvement in focus, concentration, and productivity when taking medication.   No other issues or concerns.         Review of Systems   Constitutional:  Negative for unexpected weight change.   Allergic/Immunologic: Negative for immunocompromised state.   Psychiatric/Behavioral:  Positive for decreased concentration. Negative for dysphoric mood. The patient is not nervous/anxious.        Objective   LMP 12/22/2019 (Approximate)     Physical Exam  Constitutional:       General: She is not in acute distress.     Appearance: She is not ill-appearing.   Pulmonary:      Effort: Pulmonary effort is normal.   Neurological:      Mental Status: She is alert and oriented to person, place, and time.   Psychiatric:         Mood and Affect: Mood normal.         Behavior: Behavior normal.         Thought Content: Thought content normal.         Judgment: Judgment normal.         Visit Time  Total Visit  Duration: 10 minutes spent counseling pt on dx/impression, meds, tx plan, reviewing and updating history ,documenting in record.   Patient was counseled regarding instructions for management which included: impression/diagnosis, risk/benefits of treatment options, importance of compliance with treatment, risk factor reduction, and prognosis.   I have reviewed the instructions with the patient answering all questions and patient verbalized understanding.

## 2024-12-12 NOTE — ASSESSMENT & PLAN NOTE
Orders:    amphetamine-dextroamphetamine (ADDERALL, 20MG,) 20 mg tablet; Take 1 tablet (20 mg total) by mouth daily Max Daily Amount: 20 mg

## 2025-01-09 ENCOUNTER — TELEMEDICINE (OUTPATIENT)
Dept: FAMILY MEDICINE CLINIC | Facility: CLINIC | Age: 50
End: 2025-01-09
Payer: COMMERCIAL

## 2025-01-09 DIAGNOSIS — F41.9 ANXIETY: Primary | ICD-10-CM

## 2025-01-09 DIAGNOSIS — F90.1 ADHD (ATTENTION DEFICIT HYPERACTIVITY DISORDER), PREDOMINANTLY HYPERACTIVE IMPULSIVE TYPE: ICD-10-CM

## 2025-01-09 PROCEDURE — 98005 SYNCH AUDIO-VIDEO EST LOW 20: CPT | Performed by: NURSE PRACTITIONER

## 2025-01-09 RX ORDER — DEXTROAMPHETAMINE SACCHARATE, AMPHETAMINE ASPARTATE, DEXTROAMPHETAMINE SULFATE AND AMPHETAMINE SULFATE 5; 5; 5; 5 MG/1; MG/1; MG/1; MG/1
20 TABLET ORAL DAILY
Qty: 30 TABLET | Refills: 0 | Status: SHIPPED | OUTPATIENT
Start: 2025-01-09

## 2025-01-09 RX ORDER — HYDROXYZINE HYDROCHLORIDE 10 MG/1
10 TABLET, FILM COATED ORAL 2 TIMES DAILY PRN
Qty: 60 TABLET | Refills: 1 | Status: SHIPPED | OUTPATIENT
Start: 2025-01-09

## 2025-01-09 NOTE — PROGRESS NOTES
Virtual Regular Visit  Name: Nica Weinstein      : 1975      MRN: [id number]  Encounter Provider: ZENOBIA Reed  Encounter Date: 2025   Encounter department: [facility]      Verification of patient location:  Patient is located at Home in the following state in which I hold an active license [place] :  Assessment & Plan       1. ADHD (attention deficit hyperactivity disorder), predominantly hyperactive impulsive type  Patient clinically stable today.  Given that the patient reports overall improved level functioning without significant side effects, I felt a reasonable to continue the patient on current agent and dosing schedule as needed for ADDH  Continue current treatment plan.  Reviewed .  Review Appropriate. Patient is not receiving medications from other  Providers.  No physical or psychological signs of dependence.  Patient compliant with our agreement.  Again discussed the risks of developing physical/psychological dependence of the controlled substance.  Discussed risks, dangers of addiction overdose.  Discussed dangers of concurrent use of current medications with alcohol, benzos and other depressants.  Side effects include but are not limited to nausea, vomiting, GI intolerance, sedation, constipation, mental clouding, opioid-induced hyperalgesia, endocrine dysfunction, addiction, dependence, and tolerance. The patient was asked to take his medications only as prescribed and directed, never in excess, and never for any other reason other than for ADD/ADHD. The patient was also asked to keep his medications out of the reach of others and away from children, preferably in a locked drawer.  Patient is advised that he/she must comply with prescribed treatment plans related to current  disorder including counseling, follow-up, use of adjunctive non pharmacologic approaches.  If he/she does not comply with prescribed treatment plans or violate controlled substance  agreement he may be subject to dismissal from this practice.  He/ she verbalizes understanding and agreement of the above.    - amphetamine-dextroamphetamine (ADDERALL, 20MG,) 20 mg tablet; Take 1 tablet (20 mg total) by mouth daily Max Daily Amount: 20 mg  Dispense: 30 tablet; Refill: 0    2. Anxiety (Primary)  Stress management. Activities to divert attention when possible.   Conscious breathing techniques as discussed.   Coping mechanisms and strategies vary from person to person so try to utilize strategies that you think may work for you (such as meditation, music, etc. ).  - hydrOXYzine HCL (ATARAX) 10 mg tablet; Take 1 tablet (10 mg total) by mouth 2 (two) times a day as needed for anxiety  Dispense: 60 tablet; Refill: 1          Encounter provider ZENOBIA Reed    The patient was identified by name and date of birth. Nica Weinstein was informed that this is a telemedicine visit and that the visit is being conducted through the [employer] platform. She agrees to proceed..  My office door was closed. No one else was in the room.  She acknowledged consent and understanding of privacy and security of the video platform. The patient has agreed to participate and understands they can discontinue the visit at any time.    Patient is aware this is a billable service.     History of Present Illness     Follow up on ADHD/med check  Currently on adderall 20mg daily.   Taking as prescribed.   Tolerating with no side effects  Pt reports significant improvement in focus, concentration, and productivity when taking medication.   Has intermittent anxiety. Occ uses hydroxyzine. Saw counselor in past. Needs refill. No panic attacks.               Review of Systems   Constitutional:  Negative for unexpected weight change.   Respiratory:  Negative for chest tightness.    Cardiovascular:  Negative for palpitations.   Gastrointestinal:  Negative for nausea.   Skin:  Negative for rash.   Allergic/Immunologic: Negative for  immunocompromised state.   Psychiatric/Behavioral:  Positive for decreased concentration. Negative for dysphoric mood, self-injury and suicidal ideas. The patient is nervous/anxious.        Objective   LMP 12/22/2019 (Approximate)     Physical Exam  Constitutional:       General: She is not in acute distress.     Appearance: She is not ill-appearing.   Pulmonary:      Effort: Pulmonary effort is normal.   Skin:     Coloration: Skin is not jaundiced or pale.   Neurological:      Mental Status: She is alert and oriented to person, place, and time.   Psychiatric:         Mood and Affect: Mood normal.         Behavior: Behavior normal.         Thought Content: Thought content normal.         Judgment: Judgment normal.         Visit Time  Visit Time  Total Visit Duration: 10 minutes spent counseling pt on dx/impression, med, tx plan, updating/obtaining history, and documenting in record.   Patient was counseled regarding instructions for management which included: impression/diagnosis, risk/benefits of treatment options, importance of compliance with treatment, risk factor reduction, and prognosis.   I have reviewed the instructions with the patient answering all questions and patient verbalized understanding.